# Patient Record
Sex: FEMALE
[De-identification: names, ages, dates, MRNs, and addresses within clinical notes are randomized per-mention and may not be internally consistent; named-entity substitution may affect disease eponyms.]

---

## 2023-04-08 PROBLEM — Z00.00 ENCOUNTER FOR PREVENTIVE HEALTH EXAMINATION: Status: ACTIVE | Noted: 2023-04-08

## 2023-04-19 ENCOUNTER — APPOINTMENT (OUTPATIENT)
Dept: MRI IMAGING | Facility: CLINIC | Age: 23
End: 2023-04-19
Payer: MEDICAID

## 2023-04-19 PROCEDURE — A9585: CPT

## 2023-04-19 PROCEDURE — 70553 MRI BRAIN STEM W/O & W/DYE: CPT

## 2024-06-05 ENCOUNTER — NON-APPOINTMENT (OUTPATIENT)
Age: 24
End: 2024-06-05

## 2024-06-24 ENCOUNTER — NON-APPOINTMENT (OUTPATIENT)
Age: 24
End: 2024-06-24

## 2024-08-21 ENCOUNTER — APPOINTMENT (OUTPATIENT)
Dept: ANTEPARTUM | Facility: CLINIC | Age: 24
End: 2024-08-21

## 2024-08-21 ENCOUNTER — ASOB RESULT (OUTPATIENT)
Age: 24
End: 2024-08-21

## 2024-08-21 PROCEDURE — 76813 OB US NUCHAL MEAS 1 GEST: CPT

## 2024-08-21 PROCEDURE — 76802 OB US < 14 WKS ADDL FETUS: CPT | Mod: 59

## 2024-08-21 PROCEDURE — 76814 OB US NUCHAL MEAS ADD-ON: CPT

## 2024-08-21 PROCEDURE — 76801 OB US < 14 WKS SINGLE FETUS: CPT | Mod: 59

## 2024-08-21 PROCEDURE — 99204 OFFICE O/P NEW MOD 45 MIN: CPT | Mod: 25

## 2024-09-06 ENCOUNTER — EMERGENCY (EMERGENCY)
Facility: HOSPITAL | Age: 24
LOS: 1 days | Discharge: HOME | End: 2024-09-06
Attending: STUDENT IN AN ORGANIZED HEALTH CARE EDUCATION/TRAINING PROGRAM
Payer: COMMERCIAL

## 2024-09-06 VITALS
WEIGHT: 180.56 LBS | SYSTOLIC BLOOD PRESSURE: 139 MMHG | TEMPERATURE: 99 F | HEART RATE: 110 BPM | DIASTOLIC BLOOD PRESSURE: 83 MMHG | OXYGEN SATURATION: 99 % | RESPIRATION RATE: 18 BRPM | HEIGHT: 66 IN

## 2024-09-06 LAB
ALBUMIN SERPL ELPH-MCNC: 3.9 G/DL — SIGNIFICANT CHANGE UP (ref 3.3–5.2)
ALP SERPL-CCNC: 75 U/L — SIGNIFICANT CHANGE UP (ref 40–120)
ALT FLD-CCNC: 15 U/L — SIGNIFICANT CHANGE UP
ANION GAP SERPL CALC-SCNC: 15 MMOL/L — SIGNIFICANT CHANGE UP (ref 5–17)
APPEARANCE UR: CLEAR — SIGNIFICANT CHANGE UP
APTT BLD: 25.5 SEC — SIGNIFICANT CHANGE UP (ref 24.5–35.6)
AST SERPL-CCNC: 18 U/L — SIGNIFICANT CHANGE UP
BACTERIA # UR AUTO: ABNORMAL /HPF
BASOPHILS # BLD AUTO: 0.03 K/UL — SIGNIFICANT CHANGE UP (ref 0–0.2)
BASOPHILS NFR BLD AUTO: 0.2 % — SIGNIFICANT CHANGE UP (ref 0–2)
BILIRUB SERPL-MCNC: 0.2 MG/DL — LOW (ref 0.4–2)
BILIRUB UR-MCNC: NEGATIVE — SIGNIFICANT CHANGE UP
BLD GP AB SCN SERPL QL: SIGNIFICANT CHANGE UP
BUN SERPL-MCNC: 5.2 MG/DL — LOW (ref 8–20)
CALCIUM SERPL-MCNC: 9.5 MG/DL — SIGNIFICANT CHANGE UP (ref 8.4–10.5)
CAST: 0 /LPF — SIGNIFICANT CHANGE UP (ref 0–4)
CHLORIDE SERPL-SCNC: 100 MMOL/L — SIGNIFICANT CHANGE UP (ref 96–108)
CO2 SERPL-SCNC: 22 MMOL/L — SIGNIFICANT CHANGE UP (ref 22–29)
COLOR SPEC: YELLOW — SIGNIFICANT CHANGE UP
CREAT SERPL-MCNC: 0.45 MG/DL — LOW (ref 0.5–1.3)
DIFF PNL FLD: NEGATIVE — SIGNIFICANT CHANGE UP
EGFR: 139 ML/MIN/1.73M2 — SIGNIFICANT CHANGE UP
EOSINOPHIL # BLD AUTO: 0.02 K/UL — SIGNIFICANT CHANGE UP (ref 0–0.5)
EOSINOPHIL NFR BLD AUTO: 0.1 % — SIGNIFICANT CHANGE UP (ref 0–6)
GLUCOSE SERPL-MCNC: 121 MG/DL — HIGH (ref 70–99)
GLUCOSE UR QL: NEGATIVE MG/DL — SIGNIFICANT CHANGE UP
HCT VFR BLD CALC: 33.6 % — LOW (ref 34.5–45)
HGB BLD-MCNC: 11.9 G/DL — SIGNIFICANT CHANGE UP (ref 11.5–15.5)
IMM GRANULOCYTES NFR BLD AUTO: 0.4 % — SIGNIFICANT CHANGE UP (ref 0–0.9)
INR BLD: 1.01 RATIO — SIGNIFICANT CHANGE UP (ref 0.85–1.18)
KETONES UR-MCNC: >=160 MG/DL
LDH SERPL L TO P-CCNC: 171 U/L — SIGNIFICANT CHANGE UP (ref 98–192)
LEUKOCYTE ESTERASE UR-ACNC: NEGATIVE — SIGNIFICANT CHANGE UP
LYMPHOCYTES # BLD AUTO: 1.85 K/UL — SIGNIFICANT CHANGE UP (ref 1–3.3)
LYMPHOCYTES # BLD AUTO: 13.9 % — SIGNIFICANT CHANGE UP (ref 13–44)
MCHC RBC-ENTMCNC: 29.8 PG — SIGNIFICANT CHANGE UP (ref 27–34)
MCHC RBC-ENTMCNC: 35.4 GM/DL — SIGNIFICANT CHANGE UP (ref 32–36)
MCV RBC AUTO: 84 FL — SIGNIFICANT CHANGE UP (ref 80–100)
MONOCYTES # BLD AUTO: 0.56 K/UL — SIGNIFICANT CHANGE UP (ref 0–0.9)
MONOCYTES NFR BLD AUTO: 4.2 % — SIGNIFICANT CHANGE UP (ref 2–14)
NEUTROPHILS # BLD AUTO: 10.82 K/UL — HIGH (ref 1.8–7.4)
NEUTROPHILS NFR BLD AUTO: 81.2 % — HIGH (ref 43–77)
NITRITE UR-MCNC: NEGATIVE — SIGNIFICANT CHANGE UP
PH UR: 6 — SIGNIFICANT CHANGE UP (ref 5–8)
PLATELET # BLD AUTO: 242 K/UL — SIGNIFICANT CHANGE UP (ref 150–400)
POTASSIUM SERPL-MCNC: 3.7 MMOL/L — SIGNIFICANT CHANGE UP (ref 3.5–5.3)
POTASSIUM SERPL-SCNC: 3.7 MMOL/L — SIGNIFICANT CHANGE UP (ref 3.5–5.3)
PROT SERPL-MCNC: 7 G/DL — SIGNIFICANT CHANGE UP (ref 6.6–8.7)
PROT UR-MCNC: NEGATIVE MG/DL — SIGNIFICANT CHANGE UP
PROTHROM AB SERPL-ACNC: 11.2 SEC — SIGNIFICANT CHANGE UP (ref 9.5–13)
RBC # BLD: 4 M/UL — SIGNIFICANT CHANGE UP (ref 3.8–5.2)
RBC # FLD: 13.2 % — SIGNIFICANT CHANGE UP (ref 10.3–14.5)
RBC CASTS # UR COMP ASSIST: 2 /HPF — SIGNIFICANT CHANGE UP (ref 0–4)
SODIUM SERPL-SCNC: 137 MMOL/L — SIGNIFICANT CHANGE UP (ref 135–145)
SP GR SPEC: 1.02 — SIGNIFICANT CHANGE UP (ref 1–1.03)
SQUAMOUS # UR AUTO: 4 /HPF — SIGNIFICANT CHANGE UP (ref 0–5)
UROBILINOGEN FLD QL: 1 MG/DL — SIGNIFICANT CHANGE UP (ref 0.2–1)
WBC # BLD: 13.34 K/UL — HIGH (ref 3.8–10.5)
WBC # FLD AUTO: 13.34 K/UL — HIGH (ref 3.8–10.5)
WBC UR QL: 4 /HPF — SIGNIFICANT CHANGE UP (ref 0–5)

## 2024-09-06 PROCEDURE — 86901 BLOOD TYPING SEROLOGIC RH(D): CPT

## 2024-09-06 PROCEDURE — 80053 COMPREHEN METABOLIC PANEL: CPT

## 2024-09-06 PROCEDURE — 81001 URINALYSIS AUTO W/SCOPE: CPT

## 2024-09-06 PROCEDURE — 96374 THER/PROPH/DIAG INJ IV PUSH: CPT

## 2024-09-06 PROCEDURE — 96376 TX/PRO/DX INJ SAME DRUG ADON: CPT

## 2024-09-06 PROCEDURE — 85025 COMPLETE CBC W/AUTO DIFF WBC: CPT

## 2024-09-06 PROCEDURE — 76815 OB US LIMITED FETUS(S): CPT | Mod: 26

## 2024-09-06 PROCEDURE — 76801 OB US < 14 WKS SINGLE FETUS: CPT | Mod: 26

## 2024-09-06 PROCEDURE — 76802 OB US < 14 WKS ADDL FETUS: CPT | Mod: 26

## 2024-09-06 PROCEDURE — 85730 THROMBOPLASTIN TIME PARTIAL: CPT

## 2024-09-06 PROCEDURE — 36415 COLL VENOUS BLD VENIPUNCTURE: CPT

## 2024-09-06 PROCEDURE — 99284 EMERGENCY DEPT VISIT MOD MDM: CPT | Mod: 25

## 2024-09-06 PROCEDURE — 86850 RBC ANTIBODY SCREEN: CPT

## 2024-09-06 PROCEDURE — 99284 EMERGENCY DEPT VISIT MOD MDM: CPT

## 2024-09-06 PROCEDURE — 83615 LACTATE (LD) (LDH) ENZYME: CPT

## 2024-09-06 PROCEDURE — 85610 PROTHROMBIN TIME: CPT

## 2024-09-06 PROCEDURE — 86900 BLOOD TYPING SEROLOGIC ABO: CPT

## 2024-09-06 PROCEDURE — 76802 OB US < 14 WKS ADDL FETUS: CPT

## 2024-09-06 PROCEDURE — 87086 URINE CULTURE/COLONY COUNT: CPT

## 2024-09-06 PROCEDURE — 76801 OB US < 14 WKS SINGLE FETUS: CPT

## 2024-09-06 RX ORDER — SODIUM CHLORIDE 9 MG/ML
1000 INJECTION INTRAMUSCULAR; INTRAVENOUS; SUBCUTANEOUS ONCE
Refills: 0 | Status: COMPLETED | OUTPATIENT
Start: 2024-09-06 | End: 2024-09-06

## 2024-09-06 RX ORDER — ONDANSETRON 2 MG/ML
4 INJECTION, SOLUTION INTRAMUSCULAR; INTRAVENOUS ONCE
Refills: 0 | Status: COMPLETED | OUTPATIENT
Start: 2024-09-06 | End: 2024-09-06

## 2024-09-06 RX ORDER — LIDOCAINE/BENZALKONIUM/ALCOHOL
1 SOLUTION, NON-ORAL TOPICAL ONCE
Refills: 0 | Status: COMPLETED | OUTPATIENT
Start: 2024-09-06 | End: 2024-09-06

## 2024-09-06 RX ADMIN — Medication 1 PATCH: at 18:02

## 2024-09-06 RX ADMIN — ONDANSETRON 4 MILLIGRAM(S): 2 INJECTION, SOLUTION INTRAMUSCULAR; INTRAVENOUS at 21:16

## 2024-09-06 RX ADMIN — SODIUM CHLORIDE 1000 MILLILITER(S): 9 INJECTION INTRAMUSCULAR; INTRAVENOUS; SUBCUTANEOUS at 18:03

## 2024-09-06 RX ADMIN — ONDANSETRON 4 MILLIGRAM(S): 2 INJECTION, SOLUTION INTRAMUSCULAR; INTRAVENOUS at 18:02

## 2024-09-06 NOTE — ED PROVIDER NOTE - PATIENT PORTAL LINK FT
You can access the FollowMyHealth Patient Portal offered by [facility] by registering at the following website: [email or web address]. By joining [employer]’s FollowMyHealth portal, you will also be able to view your health information using other applications (apps) compatible with our system.

## 2024-09-06 NOTE — ED ADULT TRIAGE NOTE - CHIEF COMPLAINT QUOTE
pt c/o abdominal pain and vaginal spotting, stated worsening with ingestion. patient is 14 weeks pregnant and is twins and is high risk.

## 2024-09-06 NOTE — ED PROVIDER NOTE - CLINICAL SUMMARY MEDICAL DECISION MAKING FREE TEXT BOX
Aram ATTG  23-year-old  14 weeks pregnant twin pregnancy.  Patient has been having a headache some nausea. pt does not have an obgyn and has been going to the clinic, pt called w/ her symptoms and told by someone to go to ed to be eval for preeclampsia  pt offered tyl but does not want, has been taking zofran w/ some relief   pt endorsed one episode of vaginal  spotting today     GENERAL: Awake, alert, NAD  HEENT: NC/AT, moist mucous membranes, PERRL, EOMI  ABDOMEN: Soft, , non tender, non distended, no rebound, no guarding  BACK: paraspinal tenderness   EXT: No edema, no calf tenderness, 2+ DP pulses bilaterally, no deformities.  NEURO: A&Ox3. Moving all extremities.  SKIN: Warm and dry. No rash.  PSYCH: Normal affect.    given hx and pe pt not >20 weeks less concerned for pre-ec vaginal spotting labs assessment likely dc home

## 2024-09-08 LAB
CULTURE RESULTS: SIGNIFICANT CHANGE UP
SPECIMEN SOURCE: SIGNIFICANT CHANGE UP

## 2024-09-25 ENCOUNTER — APPOINTMENT (OUTPATIENT)
Dept: ANTEPARTUM | Facility: CLINIC | Age: 24
End: 2024-09-25

## 2024-09-25 ENCOUNTER — ASOB RESULT (OUTPATIENT)
Age: 24
End: 2024-09-25

## 2024-09-25 PROCEDURE — 76805 OB US >/= 14 WKS SNGL FETUS: CPT

## 2024-09-25 PROCEDURE — 76817 TRANSVAGINAL US OBSTETRIC: CPT

## 2024-09-25 PROCEDURE — 76810 OB US >/= 14 WKS ADDL FETUS: CPT

## 2024-10-11 ENCOUNTER — EMERGENCY (EMERGENCY)
Facility: HOSPITAL | Age: 24
LOS: 1 days | End: 2024-10-11
Attending: EMERGENCY MEDICINE
Payer: COMMERCIAL

## 2024-10-11 ENCOUNTER — INPATIENT (INPATIENT)
Facility: HOSPITAL | Age: 24
LOS: 2 days | Discharge: HOME | DRG: 833 | End: 2024-10-14
Attending: OBSTETRICS & GYNECOLOGY | Admitting: OBSTETRICS & GYNECOLOGY
Payer: COMMERCIAL

## 2024-10-11 VITALS
TEMPERATURE: 98 F | HEART RATE: 112 BPM | SYSTOLIC BLOOD PRESSURE: 128 MMHG | DIASTOLIC BLOOD PRESSURE: 87 MMHG | HEIGHT: 66 IN | RESPIRATION RATE: 20 BRPM | OXYGEN SATURATION: 99 % | WEIGHT: 179.9 LBS

## 2024-10-11 VITALS — SYSTOLIC BLOOD PRESSURE: 121 MMHG | DIASTOLIC BLOOD PRESSURE: 72 MMHG | HEART RATE: 97 BPM

## 2024-10-11 DIAGNOSIS — O26.893 OTHER SPECIFIED PREGNANCY RELATED CONDITIONS, THIRD TRIMESTER: ICD-10-CM

## 2024-10-11 DIAGNOSIS — O47.00 FALSE LABOR BEFORE 37 COMPLETED WEEKS OF GESTATION, UNSPECIFIED TRIMESTER: ICD-10-CM

## 2024-10-11 DIAGNOSIS — O26.899 OTHER SPECIFIED PREGNANCY RELATED CONDITIONS, UNSPECIFIED TRIMESTER: ICD-10-CM

## 2024-10-11 DIAGNOSIS — N93.9 ABNORMAL UTERINE AND VAGINAL BLEEDING, UNSPECIFIED: ICD-10-CM

## 2024-10-11 DIAGNOSIS — Z3A.19 19 WEEKS GESTATION OF PREGNANCY: ICD-10-CM

## 2024-10-11 DIAGNOSIS — O30.049 TWIN PREGNANCY, DICHORIONIC/DIAMNIOTIC, UNSPECIFIED TRIMESTER: ICD-10-CM

## 2024-10-11 LAB
ALBUMIN SERPL ELPH-MCNC: 3.6 G/DL — SIGNIFICANT CHANGE UP (ref 3.3–5.2)
ALP SERPL-CCNC: 93 U/L — SIGNIFICANT CHANGE UP (ref 40–120)
ALT FLD-CCNC: 8 U/L — SIGNIFICANT CHANGE UP
ANION GAP SERPL CALC-SCNC: 14 MMOL/L — SIGNIFICANT CHANGE UP (ref 5–17)
APPEARANCE UR: CLEAR — SIGNIFICANT CHANGE UP
APTT BLD: 26.1 SEC — SIGNIFICANT CHANGE UP (ref 24.5–35.6)
AST SERPL-CCNC: 14 U/L — SIGNIFICANT CHANGE UP
BACTERIA # UR AUTO: NEGATIVE /HPF — SIGNIFICANT CHANGE UP
BASOPHILS # BLD AUTO: 0.04 K/UL — SIGNIFICANT CHANGE UP (ref 0–0.2)
BASOPHILS NFR BLD AUTO: 0.2 % — SIGNIFICANT CHANGE UP (ref 0–2)
BILIRUB SERPL-MCNC: <0.2 MG/DL — LOW (ref 0.4–2)
BILIRUB UR-MCNC: NEGATIVE — SIGNIFICANT CHANGE UP
BLD GP AB SCN SERPL QL: SIGNIFICANT CHANGE UP
BUN SERPL-MCNC: 7 MG/DL — LOW (ref 8–20)
CALCIUM SERPL-MCNC: 9.4 MG/DL — SIGNIFICANT CHANGE UP (ref 8.4–10.5)
CAST: 0 /LPF — SIGNIFICANT CHANGE UP (ref 0–4)
CHLORIDE SERPL-SCNC: 103 MMOL/L — SIGNIFICANT CHANGE UP (ref 96–108)
CO2 SERPL-SCNC: 20 MMOL/L — LOW (ref 22–29)
COLOR SPEC: YELLOW — SIGNIFICANT CHANGE UP
CREAT SERPL-MCNC: 0.36 MG/DL — LOW (ref 0.5–1.3)
DIFF PNL FLD: ABNORMAL
EGFR: 146 ML/MIN/1.73M2 — SIGNIFICANT CHANGE UP
EOSINOPHIL # BLD AUTO: 0.02 K/UL — SIGNIFICANT CHANGE UP (ref 0–0.5)
EOSINOPHIL NFR BLD AUTO: 0.1 % — SIGNIFICANT CHANGE UP (ref 0–6)
FIBRINOGEN PPP-MCNC: 504 MG/DL — HIGH (ref 200–450)
GLUCOSE SERPL-MCNC: 65 MG/DL — LOW (ref 70–99)
GLUCOSE UR QL: NEGATIVE MG/DL — SIGNIFICANT CHANGE UP
HCT VFR BLD CALC: 30.3 % — LOW (ref 34.5–45)
HGB BLD-MCNC: 10.4 G/DL — LOW (ref 11.5–15.5)
IMM GRANULOCYTES NFR BLD AUTO: 0.9 % — SIGNIFICANT CHANGE UP (ref 0–0.9)
KETONES UR-MCNC: 15 MG/DL
LEUKOCYTE ESTERASE UR-ACNC: ABNORMAL
LYMPHOCYTES # BLD AUTO: 14.4 % — SIGNIFICANT CHANGE UP (ref 13–44)
LYMPHOCYTES # BLD AUTO: 2.31 K/UL — SIGNIFICANT CHANGE UP (ref 1–3.3)
MCHC RBC-ENTMCNC: 29.5 PG — SIGNIFICANT CHANGE UP (ref 27–34)
MCHC RBC-ENTMCNC: 34.3 GM/DL — SIGNIFICANT CHANGE UP (ref 32–36)
MCV RBC AUTO: 86.1 FL — SIGNIFICANT CHANGE UP (ref 80–100)
MONOCYTES # BLD AUTO: 0.92 K/UL — HIGH (ref 0–0.9)
MONOCYTES NFR BLD AUTO: 5.7 % — SIGNIFICANT CHANGE UP (ref 2–14)
NEUTROPHILS # BLD AUTO: 12.65 K/UL — HIGH (ref 1.8–7.4)
NEUTROPHILS NFR BLD AUTO: 78.7 % — HIGH (ref 43–77)
NITRITE UR-MCNC: NEGATIVE — SIGNIFICANT CHANGE UP
PH UR: 6 — SIGNIFICANT CHANGE UP (ref 5–8)
PLATELET # BLD AUTO: 258 K/UL — SIGNIFICANT CHANGE UP (ref 150–400)
POTASSIUM SERPL-MCNC: 3.7 MMOL/L — SIGNIFICANT CHANGE UP (ref 3.5–5.3)
POTASSIUM SERPL-SCNC: 3.7 MMOL/L — SIGNIFICANT CHANGE UP (ref 3.5–5.3)
PROT SERPL-MCNC: 6.8 G/DL — SIGNIFICANT CHANGE UP (ref 6.6–8.7)
PROT UR-MCNC: NEGATIVE MG/DL — SIGNIFICANT CHANGE UP
RBC # BLD: 3.52 M/UL — LOW (ref 3.8–5.2)
RBC # FLD: 14.3 % — SIGNIFICANT CHANGE UP (ref 10.3–14.5)
RBC CASTS # UR COMP ASSIST: 37 /HPF — HIGH (ref 0–4)
SODIUM SERPL-SCNC: 136 MMOL/L — SIGNIFICANT CHANGE UP (ref 135–145)
SP GR SPEC: 1.02 — SIGNIFICANT CHANGE UP (ref 1–1.03)
SQUAMOUS # UR AUTO: 4 /HPF — SIGNIFICANT CHANGE UP (ref 0–5)
UROBILINOGEN FLD QL: 1 MG/DL — SIGNIFICANT CHANGE UP (ref 0.2–1)
WBC # BLD: 16.09 K/UL — HIGH (ref 3.8–10.5)
WBC # FLD AUTO: 16.09 K/UL — HIGH (ref 3.8–10.5)
WBC UR QL: 11 /HPF — HIGH (ref 0–5)

## 2024-10-11 PROCEDURE — 99284 EMERGENCY DEPT VISIT MOD MDM: CPT

## 2024-10-11 PROCEDURE — 99282 EMERGENCY DEPT VISIT SF MDM: CPT

## 2024-10-11 RX ORDER — ACETAMINOPHEN 325 MG
975 TABLET ORAL ONCE
Refills: 0 | Status: COMPLETED | OUTPATIENT
Start: 2024-10-11 | End: 2024-10-11

## 2024-10-11 RX ORDER — ONDANSETRON HCL/PF 4 MG/2 ML
4 VIAL (ML) INJECTION ONCE
Refills: 0 | Status: COMPLETED | OUTPATIENT
Start: 2024-10-11 | End: 2024-10-11

## 2024-10-11 RX ORDER — PRENATAL VIT,CAL 76/IRON/FOLIC 29 MG-1 MG
1 TABLET ORAL DAILY
Refills: 0 | Status: DISCONTINUED | OUTPATIENT
Start: 2024-10-11 | End: 2024-10-13

## 2024-10-11 RX ORDER — INFLUENZA VIRUS VACCINE 15; 15; 15; 15 UG/.5ML; UG/.5ML; UG/.5ML; UG/.5ML
0.5 SUSPENSION INTRAMUSCULAR ONCE
Refills: 0 | Status: COMPLETED | OUTPATIENT
Start: 2024-10-11 | End: 2024-10-14

## 2024-10-11 RX ADMIN — Medication 975 MILLIGRAM(S): at 19:00

## 2024-10-11 RX ADMIN — Medication 975 MILLIGRAM(S): at 17:58

## 2024-10-11 RX ADMIN — Medication 4 MILLIGRAM(S): at 17:58

## 2024-10-11 NOTE — OB RN TRIAGE NOTE - FALL HARM RISK - UNIVERSAL INTERVENTIONS
Bed in lowest position, wheels locked, appropriate side rails in place/Call bell, personal items and telephone in reach/Instruct patient to call for assistance before getting out of bed or chair/Non-slip footwear when patient is out of bed/[place] to call system/Physically safe environment - no spills, clutter or unnecessary equipment/Purposeful Proactive Rounding/Room/bathroom lighting operational, light cord in reach

## 2024-10-11 NOTE — ED PROVIDER NOTE - PATIENT PORTAL LINK FT
You can access the FollowMyHealth Patient Portal offered by [facility] by registering at the following website: [email or web address]. By joining [employer]’s FollowMyHealth portal, you will also be able to view your health information using other applications (apps) compatible with our system.

## 2024-10-11 NOTE — CONSULT NOTE ADULT - PROBLEM SELECTOR RECOMMENDATION 5
Twin A noted to be anhydramnios. Likely secondary to rupture of membranes. Discussed poor prognosis of PPROM at 19w6d with patient. Discussed risks and benefits of termination of pregnancy and dilation and evacuation. Discussed risks and benefits of expectant management including risks of chorioamnionitis and PTD. Discussed option of management via interval pregnancy but low likelihood of success. At this time patient would like to proceed with expectant management. Recommend serial abdominal exam every shift.

## 2024-10-11 NOTE — ED PROVIDER NOTE - CLINICAL SUMMARY MEDICAL DECISION MAKING FREE TEXT BOX
Patient presenting with lower abdominal cramping and vaginal bleeding in setting of pregnancy.  Given patient gestational age spoke with L&D who will take the patient for further evaluation.  Patient escorted to L&D

## 2024-10-11 NOTE — CONSULT NOTE ADULT - SUBJECTIVE AND OBJECTIVE BOX
SPENCER THAKKAR  23y  with di-di twin gestation at 19w4d by LMP who presents to L&D for vaginal bleeding since last night with new clots and cramping. Pt reports this is not the first episode of vaginal bleeding with her pregnancy and has had 2-3 episodes of spotting during the pregnancy however yesterday and today the bleeding had been heavier. She denies leakage of fluid. She has never felt the babies move. Denies fevers, chills, nausea, vomiting, chest pain, SOB, dizziness and headache. No other complaints at this time.     CHAS: 3/3/25  LMP: 24    Prenatal course is significant for:  di-di twin gestation  Obesity in pregnancy    POB: G1  PGYN: -fibroids, -ovarian cysts, denies STD hx, denies abnormal PAPs   PMH: gastritis, anxiety/depression  PSH: Denies  SH: Denies EtOH, tobacco and illicit drug use during this pregnancy; feels safe at home   Meds: PNVs  Allergies: NKDA      REVIEW OF SYSTEMS:    CONSTITUTIONAL: No weakness, fevers or chills  EYES/ENT: No visual changes;  No vertigo or throat pain   NECK: No pain or stiffness  RESPIRATORY: No cough, wheezing, hemoptysis; No shortness of breath  CARDIOVASCULAR: No chest pain or palpitations  GASTROINTESTINAL: No abdominal or epigastric pain. No nausea, vomiting, or hematemesis; No diarrhea or constipation. No melena or hematochezia.  GENITOURINARY: No dysuria, frequency or hematuria  NEUROLOGICAL: No numbness or weakness  SKIN: No itching, burning, rashes, or lesions   All other review of systems is negative unless indicated above.    Vital Signs:  Vital Signs Last 24 Hrs  T(C): 37 (11 Oct 2024 15:05), Max: 37 (11 Oct 2024 14:38)  T(F): 98.6 (11 Oct 2024 15:05), Max: 98.6 (11 Oct 2024 14:38)  HR: 120 (11 Oct 2024 16:58) (97 - 120)  BP: 135/82 (11 Oct 2024 16:58) (119/80 - 135/82)  RR: 20 (11 Oct 2024 15:05) (17 - 20)  SpO2: 99% (11 Oct 2024 13:07) (99% - 99%)    Parameters below as of 11 Oct 2024 15:05  Patient On (Oxygen Delivery Method): room air      Height (cm): 167.6 (10-11-24 @ 15:05), 167.6 (10-11-24 @ 13:07)  Weight (kg): 81.6 (10-11-24 @ 15:05), 81.6 (10-11-24 @ 13:07)  BMI (kg/m2): 29 (10-11-24 @ 15:05), 29 (10-11-24 @ 13:07)  BSA (m2): 1.91 (10-11-24 @ 15:05), 1.91 (10-11-24 @ 13:07)    Physical Exam:  General: Adult female in NAD  Head/Neck: No neck masses, no lymphadenopathy  Abdomen: soft, non-tender, gravid uterus  Ext: No cyanosis, edema or calf tenderness  Skin: No rashes or lesions on exposed skin  Neuro: Grossly intact  SSE: 10cc old dark blood in vaginal vault cleaned with gauze. Cervix visually dilated  SVE:  1.5/50/-3  Bedside sono: Twin A 156bpm, anterior/fundal placenta, vertex; Twin B: 146bpm, breech, posterior    Labs:                          10.4   16.09 )-----------( 258      ( 11 Oct 2024 15:46 )             30.3     10-11    136  |  103  |  7.0[L]  ----------------------------<  65[L]  3.7   |  20.0[L]  |  0.36[L]    Ca    9.4      11 Oct 2024 15:46    TPro  6.8  /  Alb  3.6  /  TBili  <0.2[L]  /  DBili  x   /  AST  14  /  ALT  8   /  AlkPhos  93  10-11    PTT - ( 11 Oct 2024 15:46 )  PTT:26.1 sec            Radiology:    MEDICATIONS  (STANDING):  prenatal multivitamin 1 Tablet(s) Oral daily    MEDICATIONS  (PRN):   SPENCER THAKKAR  23y  with di-di twin gestation at 19w6d by LMP who presents to L&D for vaginal bleeding since last night with new clots and cramping. Pt reports this is not the first episode of vaginal bleeding with her pregnancy and has had 2-3 episodes of spotting during the pregnancy however yesterday and today the bleeding had been heavier. She denies leakage of fluid. She has never felt the babies move. Denies fevers, chills, nausea, vomiting, chest pain, SOB, dizziness and headache. No other complaints at this time.     CHAS: 3/3/25  LMP: 24    Prenatal course is significant for:  di-di twin gestation  Obesity in pregnancy    POB: G1  PGYN: -fibroids, -ovarian cysts, denies STD hx, denies abnormal PAPs   PMH: gastritis, anxiety/depression  PSH: Denies  SH: Denies EtOH, tobacco and illicit drug use during this pregnancy; feels safe at home   Meds: PNVs  Allergies: NKDA      REVIEW OF SYSTEMS:    CONSTITUTIONAL: No weakness, fevers or chills  EYES/ENT: No visual changes;  No vertigo or throat pain   NECK: No pain or stiffness  RESPIRATORY: No cough, wheezing, hemoptysis; No shortness of breath  CARDIOVASCULAR: No chest pain or palpitations  GASTROINTESTINAL: No abdominal or epigastric pain. No nausea, vomiting, or hematemesis; No diarrhea or constipation. No melena or hematochezia.  GENITOURINARY: No dysuria, frequency or hematuria  NEUROLOGICAL: No numbness or weakness  SKIN: No itching, burning, rashes, or lesions   All other review of systems is negative unless indicated above.    Vital Signs:  Vital Signs Last 24 Hrs  T(C): 37 (11 Oct 2024 15:05), Max: 37 (11 Oct 2024 14:38)  T(F): 98.6 (11 Oct 2024 15:05), Max: 98.6 (11 Oct 2024 14:38)  HR: 120 (11 Oct 2024 16:58) (97 - 120)  BP: 135/82 (11 Oct 2024 16:58) (119/80 - 135/82)  RR: 20 (11 Oct 2024 15:05) (17 - 20)  SpO2: 99% (11 Oct 2024 13:07) (99% - 99%)    Parameters below as of 11 Oct 2024 15:05  Patient On (Oxygen Delivery Method): room air      Height (cm): 167.6 (10-11-24 @ 15:05), 167.6 (10-11-24 @ 13:07)  Weight (kg): 81.6 (10-11-24 @ 15:05), 81.6 (10-11-24 @ 13:07)  BMI (kg/m2): 29 (10-11-24 @ 15:05), 29 (10-11-24 @ 13:07)  BSA (m2): 1.91 (10-11-24 @ 15:05), 1.91 (10-11-24 @ 13:07)    Physical Exam:  General: Adult female in NAD  Head/Neck: No neck masses, no lymphadenopathy  Abdomen: soft, non-tender, gravid uterus  Ext: No cyanosis, edema or calf tenderness  Skin: No rashes or lesions on exposed skin  Neuro: Grossly intact  SSE: 10cc old dark blood in vaginal vault cleaned with gauze. Cervix visually dilated  SVE:  1.5/50/-3  Bedside sono: Twin A 156bpm, anterior/fundal placenta, vertex; Twin B: 146bpm, breech, posterior    Labs:                          10.4   16.09 )-----------( 258      ( 11 Oct 2024 15:46 )             30.3     10    136  |  103  |  7.0[L]  ----------------------------<  65[L]  3.7   |  20.0[L]  |  0.36[L]    Ca    9.4      11 Oct 2024 15:46    TPro  6.8  /  Alb  3.6  /  TBili  <0.2[L]  /  DBili  x   /  AST  14  /  ALT  8   /  AlkPhos  93  10-11    PTT - ( 11 Oct 2024 15:46 )  PTT:26.1 sec            Radiology:    MEDICATIONS  (STANDING):  prenatal multivitamin 1 Tablet(s) Oral daily    MEDICATIONS  (PRN):   SPENCER THAKKAR  23y  with di-di twin gestation at 19w6d by LMP who presents to L&D for vaginal bleeding since last night with new clots and cramping. Pt reports this is not the first episode of vaginal bleeding with her pregnancy and has had 2-3 episodes of spotting during the pregnancy however yesterday and today the bleeding had been heavier. She denies leakage of fluid. She has never felt the babies move. Denies fevers, chills, nausea, vomiting, chest pain, SOB, dizziness and headache. No other complaints at this time.     CHAS: 3/1/25  LMP: 24    Prenatal course is significant for:  di-di twin gestation  Obesity in pregnancy    POB: G1  PGYN: -fibroids, -ovarian cysts, denies STD hx, denies abnormal PAPs   PMH: gastritis, anxiety/depression  PSH: Denies  SH: Denies EtOH, tobacco and illicit drug use during this pregnancy; feels safe at home   Meds: PNVs  Allergies: NKDA      REVIEW OF SYSTEMS:    CONSTITUTIONAL: No weakness, fevers or chills  EYES/ENT: No visual changes;  No vertigo or throat pain   NECK: No pain or stiffness  RESPIRATORY: No cough, wheezing, hemoptysis; No shortness of breath  CARDIOVASCULAR: No chest pain or palpitations  GASTROINTESTINAL: No abdominal or epigastric pain. No nausea, vomiting, or hematemesis; No diarrhea or constipation. No melena or hematochezia.  GENITOURINARY: No dysuria, frequency or hematuria  NEUROLOGICAL: No numbness or weakness  SKIN: No itching, burning, rashes, or lesions   All other review of systems is negative unless indicated above.    Vital Signs:  Vital Signs Last 24 Hrs  T(C): 37 (11 Oct 2024 15:05), Max: 37 (11 Oct 2024 14:38)  T(F): 98.6 (11 Oct 2024 15:05), Max: 98.6 (11 Oct 2024 14:38)  HR: 120 (11 Oct 2024 16:58) (97 - 120)  BP: 135/82 (11 Oct 2024 16:58) (119/80 - 135/82)  RR: 20 (11 Oct 2024 15:05) (17 - 20)  SpO2: 99% (11 Oct 2024 13:07) (99% - 99%)    Parameters below as of 11 Oct 2024 15:05  Patient On (Oxygen Delivery Method): room air      Height (cm): 167.6 (10-11-24 @ 15:05), 167.6 (10-11-24 @ 13:07)  Weight (kg): 81.6 (10-11-24 @ 15:05), 81.6 (10-11-24 @ 13:07)  BMI (kg/m2): 29 (10-11-24 @ 15:05), 29 (10-11-24 @ 13:07)  BSA (m2): 1.91 (10-11-24 @ 15:05), 1.91 (10-11-24 @ 13:07)    Physical Exam:  General: Adult female in NAD  Head/Neck: No neck masses, no lymphadenopathy  Abdomen: soft, non-tender, gravid uterus  Ext: No cyanosis, edema or calf tenderness  Skin: No rashes or lesions on exposed skin  Neuro: Grossly intact  SSE: 10cc old dark blood in vaginal vault cleaned with gauze. Cervix visually dilated  SVE:  1.5/50/-3  Bedside sono: Twin A 156bpm, anterior/fundal placenta, vertex; Twin B: 146bpm, breech, posterior    Labs:                          10.4   16.09 )-----------( 258      ( 11 Oct 2024 15:46 )             30.3     10    136  |  103  |  7.0[L]  ----------------------------<  65[L]  3.7   |  20.0[L]  |  0.36[L]    Ca    9.4      11 Oct 2024 15:46    TPro  6.8  /  Alb  3.6  /  TBili  <0.2[L]  /  DBili  x   /  AST  14  /  ALT  8   /  AlkPhos  93  10-11    PTT - ( 11 Oct 2024 15:46 )  PTT:26.1 sec            Radiology:    MEDICATIONS  (STANDING):  prenatal multivitamin 1 Tablet(s) Oral daily    MEDICATIONS  (PRN):   SPENCER THAKKAR  23y  with di-di twin gestation at 19w6d by LMP who presents to L&D for vaginal bleeding since last night with new clots and cramping. Pt reports this is not the first episode of vaginal bleeding with her pregnancy and has had 2-3 episodes of spotting during the pregnancy however yesterday and today the bleeding had been heavier. She denies leakage of fluid. She has never felt the babies move. Denies fevers, chills, nausea, vomiting, chest pain, SOB, dizziness and headache. No other complaints at this time.     CHAS: 3/1/25  LMP: 24    Prenatal course is significant for:  di-di twin gestation  Obesity in pregnancy    POB: G1  PGYN: -fibroids, -ovarian cysts, denies STD hx, denies abnormal PAPs   PMH: gastritis, anxiety/depression  PSH: Denies  SH: Denies EtOH, tobacco and illicit drug use during this pregnancy; feels safe at home   Meds: PNVs  Allergies: NKDA      REVIEW OF SYSTEMS:    CONSTITUTIONAL: No weakness, fevers or chills  EYES/ENT: No visual changes;  No vertigo or throat pain   NECK: No pain or stiffness  RESPIRATORY: No cough, wheezing, hemoptysis; No shortness of breath  CARDIOVASCULAR: No chest pain or palpitations  GASTROINTESTINAL: No abdominal or epigastric pain. No nausea, vomiting, or hematemesis; No diarrhea or constipation. No melena or hematochezia.  GENITOURINARY: No dysuria, frequency or hematuria  NEUROLOGICAL: No numbness or weakness  SKIN: No itching, burning, rashes, or lesions   All other review of systems is negative unless indicated above.    Vital Signs:  Vital Signs Last 24 Hrs  T(C): 37 (11 Oct 2024 15:05), Max: 37 (11 Oct 2024 14:38)  T(F): 98.6 (11 Oct 2024 15:05), Max: 98.6 (11 Oct 2024 14:38)  HR: 120 (11 Oct 2024 16:58) (97 - 120)  BP: 135/82 (11 Oct 2024 16:58) (119/80 - 135/82)  RR: 20 (11 Oct 2024 15:05) (17 - 20)  SpO2: 99% (11 Oct 2024 13:07) (99% - 99%)    Parameters below as of 11 Oct 2024 15:05  Patient On (Oxygen Delivery Method): room air      Height (cm): 167.6 (10-11-24 @ 15:05), 167.6 (10-11-24 @ 13:07)  Weight (kg): 81.6 (10-11-24 @ 15:05), 81.6 (10-11-24 @ 13:07)  BMI (kg/m2): 29 (10-11-24 @ 15:05), 29 (10-11-24 @ 13:07)  BSA (m2): 1.91 (10-11-24 @ 15:05), 1.91 (10-11-24 @ 13:07)    Physical Exam:  General: Adult female in NAD  Head/Neck: No neck masses, no lymphadenopathy  Abdomen: soft, non-tender, gravid uterus  Ext: No cyanosis, edema or calf tenderness  Skin: No rashes or lesions on exposed skin  Neuro: Grossly intact  SSE: 10cc old dark blood in vaginal vault cleaned with gauze. Cervix visually dilated, no gross pooling  SVE:  1.5/50/-3  Bedside sono: Twin A 156bpm, anterior/fundal placenta, vertex; Twin B: 146bpm, breech, posterior  Ferning negative    Labs:                          10.4   16.09 )-----------( 258      ( 11 Oct 2024 15:46 )             30.3     10    136  |  103  |  7.0[L]  ----------------------------<  65[L]  3.7   |  20.0[L]  |  0.36[L]    Ca    9.4      11 Oct 2024 15:46    TPro  6.8  /  Alb  3.6  /  TBili  <0.2[L]  /  DBili  x   /  AST  14  /  ALT  8   /  AlkPhos  93  10-11    PTT - ( 11 Oct 2024 15:46 )  PTT:26.1 sec            Radiology:    MEDICATIONS  (STANDING):  prenatal multivitamin 1 Tablet(s) Oral daily    MEDICATIONS  (PRN):   denies drug use/caffeine

## 2024-10-11 NOTE — ED ADULT TRIAGE NOTE - CHIEF COMPLAINT QUOTE
pt arrives to ED c/o abdominal pain and passing clots, pt is 19 weeks pregnant, denies N/V/D/CP/SOB, sent by OB

## 2024-10-11 NOTE — ED PROVIDER NOTE - OBJECTIVE STATEMENT
Patient is a 23-year-old female G1, P0 at 19 weeks and 4 days gestation presenting with lower abdominal pain, cramping, back pain and vaginal bleeding with clots.  Patient is not on any fertility treatments.  Has had IUP confirmed as twin gestation.  Patient notes she had similar symptoms about 1 week ago and was told that everything was okay but if symptoms worsened or return she should come back to the emergency department which happened today.  Denies any trauma, nausea, vomiting, diarrhea.  No urinary complaints.  Patient currently on Keflex for urinary tract infection.

## 2024-10-11 NOTE — OB RN TRIAGE NOTE - NSICDXPASTMEDICALHX_GEN_ALL_CORE_FT
PAST MEDICAL HISTORY:  No pertinent past medical history PAST MEDICAL HISTORY:  H/O gastritis     History of HPV infection

## 2024-10-11 NOTE — CONSULT NOTE ADULT - PROBLEM SELECTOR RECOMMENDATION 3
Di/Di twin gestation. Twin A noted to be vertex presentation, posterior placenta, anhydramnios. Twin B noted to be breech presentation, fundal placenta. MVP >5cm.

## 2024-10-11 NOTE — OB RN PATIENT PROFILE - WEIGHT: TOTAL WEIGHT IN KG
Detail Level: Simple
Price (Do Not Change): 0.00
Instructions: This plan will send the code FBSD to the PM system.  DO NOT or CHANGE the price.
-4

## 2024-10-11 NOTE — OB PROVIDER H&P - ATTENDING COMMENTS
Patient presents to L&D triage with complaints of bleeding.  On initial assessment patient found to be 1-2cm dilated, and possiblity of abruption vs PTL.  However after sonogram performed there is anhydramnios of Baby A.  The patient was counseled of the high likelihood of  rupture of membranes. Given the previable gestational age discussion was had about expectant management until GA where interventions could take place such as  corticosteroids. We discussed the high risk of developing an infection, abruption or going into labor. We are admitting her at this time monitor for these things.  We also discussed discontinuation of the pregnancy with induction of labor or D&E as anhydramnios at 19 weeks has a poor prognosis for the baby including fetal lung underdevelopment, limb contractures.  At this time the patient is not considering termination.  She would like expectant management.  We also reviewed that twin A may deliver and if possible and there are no signs of infection at the time of that delivery and maternal status is stable, a delayed interval delivery of twin B could be attempted.    The patient's WBC count is 16, currently afebrile and no further bleeding.    Admit for inpatient monitoring for now.

## 2024-10-11 NOTE — OB PROVIDER H&P - ASSESSMENT
A/P: 23y  with di-di twin gestation at 19w4d by LMP who is admitted in the setting of vaginal bleeding r/o chronic abruption vs PTL.  -Admit to L&D  -Consent  -Admission labs  -T&S, coags  -MFM consult for possible cerclage  -BV, GCCT collected    Discussed with Dr. Aden  A/P: 23y  with di-di twin gestation at 19w4d by LMP who is admitted in the setting of vaginal bleeding r/o chronic abruption vs PTL.  -Admit to L&D  -Consent  -Admission labs  -T&S, coags  -MFM consult-BV, GCCT collected    Discussed with Dr. Aden

## 2024-10-11 NOTE — CONSULT NOTE ADULT - PROBLEM SELECTOR RECOMMENDATION 2
Patient presenting with vaginal bleeding. 10-15cc of dark red blood on speculum exam. No active bleeding from os. Cervix is 1.5/50/-3.

## 2024-10-11 NOTE — CONSULT NOTE ADULT - ASSESSMENT
23y  with di-di twin gestation at 19w4d by LMP who presents to L&D for vaginal bleeding and cramping. 23y  with di-di twin gestation at 19w6d by LMP who presents to L&D for vaginal bleeding and cramping.

## 2024-10-11 NOTE — OB PROVIDER H&P - HISTORY OF PRESENT ILLNESS
23y  with di-di twin gestation at 19w4d by LMP who presents to L&D for vaginal bleeding since last night with new clots and cramping. Pt reports this is not the first episode of vaginal bleeding with her pregnancy and has had 2-3 episodes of spotting during the pregnancy however yesterday and today the bleeding had been heavier. She denies leakage of fluid. She has never felt the babies move. Denies fevers, chills, nausea, vomiting, chest pain, SOB, dizziness and headache. No other complaints at this time.     Prenatal course is significant for:  di-di twin gestation  Obesity in pregnancy    POB: G1  PGYN: -fibroids, -ovarian cysts, denies STD hx, denies abnormal PAPs   PMH: gastritis, anxiety/depression  PSH: Denies  SH: Denies EtOH, tobacco and illicit drug use during this pregnancy; feels safe at home   Meds: PNVs  Allergies: NKDA    T(C): 37 (10-11-24 @ 14:38), Max: 37 (10-11-24 @ 14:38)  HR: 106 (10-11-24 @ 15:10) (97 - 112)  BP: 119/80 (10-11-24 @ 15:10) (119/80 - 128/87)  RR: 17 (10-11-24 @ 14:38) (17 - 20)  SpO2: 99% (10-11-24 @ 13:07) (99% - 99%)    Gen: NAD, well-appearing, AAOx3   Abd: Soft, gravid  Ext: non-tender, non-edematous  SSE: 10cc old dark blood in vaginal vault cleaned with gauze. Cervix visually dilated  SVE:  1.5/50/-3, CL could not be performed at this time due to   Bedside sono:  FHT:  [place] [facility]:       A/P:   -Admit to L&D  -Consent  -Admission labs  -NPO, except ice chips   -IV fluids  -Labor: Intact/*ROM. Latent/Active labor. Prabhjot *.   -Fetus: Cat I tracing. Continuous toco and fetal monitoring.   -GBS: Negative, no GBS ppx required   -Analgesia:     Discussed with Dr. Vazquez 23y  with di-di twin gestation at 19w4d by LMP who presents to L&D for vaginal bleeding since last night with new clots and cramping. Pt reports this is not the first episode of vaginal bleeding with her pregnancy and has had 2-3 episodes of spotting during the pregnancy however yesterday and today the bleeding had been heavier. She denies leakage of fluid. She has never felt the babies move. Denies fevers, chills, nausea, vomiting, chest pain, SOB, dizziness and headache. No other complaints at this time.   CHAS: 3/3/25  LMP: 24    Prenatal course is significant for:  di-di twin gestation  Obesity in pregnancy    POB: G1  PGYN: -fibroids, -ovarian cysts, denies STD hx, denies abnormal PAPs   PMH: gastritis, anxiety/depression  PSH: Denies  SH: Denies EtOH, tobacco and illicit drug use during this pregnancy; feels safe at home   Meds: PNVs  Allergies: NKDA    T(C): 37 (10-11-24 @ 14:38), Max: 37 (10-11-24 @ 14:38)  HR: 106 (10-11-24 @ 15:10) (97 - 112)  BP: 119/80 (10-11-24 @ 15:10) (119/80 - 128/87)  RR: 17 (10-11-24 @ 14:38) (17 - 20)  SpO2: 99% (10-11-24 @ 13:07) (99% - 99%)    Gen: NAD, well-appearing, AAOx3   Abd: Soft, gravid  Ext: non-tender, non-edematous  SSE: 10cc old dark blood in vaginal vault cleaned with gauze. Cervix visually dilated  SVE:  1.5/50/-3, CL could not be performed at this time due to   Bedside sono pending.  FH:        23y  with di-di twin gestation at 19w4d by LMP who presents to L&D for vaginal bleeding since last night with new clots and cramping. Pt reports this is not the first episode of vaginal bleeding with her pregnancy and has had 2-3 episodes of spotting during the pregnancy however yesterday and today the bleeding had been heavier. She denies leakage of fluid. She has never felt the babies move. Denies fevers, chills, nausea, vomiting, chest pain, SOB, dizziness and headache. No other complaints at this time.   CHAS: 3/3/25  LMP: 24    Prenatal course is significant for:  di-di twin gestation  Obesity in pregnancy    POB: G1  PGYN: -fibroids, -ovarian cysts, denies STD hx, denies abnormal PAPs   PMH: gastritis, anxiety/depression  PSH: Denies  SH: Denies EtOH, tobacco and illicit drug use during this pregnancy; feels safe at home   Meds: PNVs  Allergies: NKDA    T(C): 37 (10-11-24 @ 14:38), Max: 37 (10-11-24 @ 14:38)  HR: 106 (10-11-24 @ 15:10) (97 - 112)  BP: 119/80 (10-11-24 @ 15:10) (119/80 - 128/87)  RR: 17 (10-11-24 @ 14:38) (17 - 20)  SpO2: 99% (10-11-24 @ 13:07) (99% - 99%)    Gen: NAD, well-appearing, AAOx3   Abd: Soft, gravid  Ext: non-tender, non-edematous  SSE: 10cc old dark blood in vaginal vault cleaned with gauze. Cervix visually dilated  SVE:  1.5/50/-3, CL could not be performed at this time due to   Bedside sono: Twin A 156bpm, fundal/anterior placenta, vertex; Twin B: 146bpm, breech, posterior        23y  with di-di twin gestation at 19w4d by LMP who presents to L&D for vaginal bleeding since last night with new clots and cramping. Pt reports this is not the first episode of vaginal bleeding with her pregnancy and has had 2-3 episodes of spotting during the pregnancy however yesterday and today the bleeding had been heavier. She denies leakage of fluid. She has never felt the babies move. Denies fevers, chills, nausea, vomiting, chest pain, SOB, dizziness and headache. No other complaints at this time.   CHAS: 3/3/25  LMP: 24    Prenatal course is significant for:  di-di twin gestation  Obesity in pregnancy    POB: G1  PGYN: -fibroids, -ovarian cysts, denies STD hx, denies abnormal PAPs   PMH: gastritis, anxiety/depression  PSH: Denies  SH: Denies EtOH, tobacco and illicit drug use during this pregnancy; feels safe at home   Meds: PNVs  Allergies: NKDA    T(C): 37 (10-11-24 @ 14:38), Max: 37 (10-11-24 @ 14:38)  HR: 106 (10-11-24 @ 15:10) (97 - 112)  BP: 119/80 (10-11-24 @ 15:10) (119/80 - 128/87)  RR: 17 (10-11-24 @ 14:38) (17 - 20)  SpO2: 99% (10-11-24 @ 13:07) (99% - 99%)    Gen: NAD, well-appearing, AAOx3   Abd: Soft, gravid  Ext: non-tender, non-edematous  SSE: 10cc old dark blood in vaginal vault cleaned with gauze. Cervix visually dilated  SVE:  1.5/50/-3, CL could not be performed at this time due to   Bedside sono: Twin A 156bpm, posterior placenta, vertex; Twin B: 146bpm, breech, anterior       23y  with di-di twin gestation at 19w4d by LMP who presents to L&D for vaginal bleeding since last night with new clots and cramping. Pt reports this is not the first episode of vaginal bleeding with her pregnancy and has had 2-3 episodes of spotting during the pregnancy however yesterday and today the bleeding had been heavier. She denies leakage of fluid. She has never felt the babies move. Denies fevers, chills, nausea, vomiting, chest pain, SOB, dizziness and headache. No other complaints at this time.   CHAS: 3/3/25  LMP: 24    Prenatal course is significant for:  di-di twin gestation  Obesity in pregnancy    POB: G1  PGYN: -fibroids, -ovarian cysts, denies STD hx, denies abnormal PAPs   PMH: gastritis, anxiety/depression  PSH: Denies  SH: Denies EtOH, tobacco and illicit drug use during this pregnancy; feels safe at home   Meds: PNVs  Allergies: NKDA    T(C): 37 (10-11-24 @ 14:38), Max: 37 (10-11-24 @ 14:38)  HR: 106 (10-11-24 @ 15:10) (97 - 112)  BP: 119/80 (10-11-24 @ 15:10) (119/80 - 128/87)  RR: 17 (10-11-24 @ 14:38) (17 - 20)  SpO2: 99% (10-11-24 @ 13:07) (99% - 99%)    Gen: NAD, well-appearing, AAOx3   Abd: Soft, gravid  Ext: non-tender, non-edematous  SSE: 10cc old dark blood in vaginal vault cleaned with gauze. Cervix visually dilated  SVE:  1.5/50/-3, CL could not be performed at this time due to   Bedside sono: Twin A 156bpm, anterior/fundal placenta, vertex; Twin B: 146bpm, breech, posterior       23y  with di-di twin gestation at 19w6d by LMP who presents to L&D for vaginal bleeding since last night with new clots and cramping. Pt reports this is not the first episode of vaginal bleeding with her pregnancy and has had 2-3 episodes of spotting during the pregnancy however yesterday and today the bleeding had been heavier. She denies leakage of fluid. She has never felt the babies move. Denies fevers, chills, nausea, vomiting, chest pain, SOB, dizziness and headache. No other complaints at this time.   CHAS: 3/3/25  LMP: 24    Prenatal course is significant for:  di-di twin gestation  Obesity in pregnancy    POB: G1  PGYN: -fibroids, -ovarian cysts, denies STD hx, denies abnormal PAPs   PMH: gastritis, anxiety/depression  PSH: Denies  SH: Denies EtOH, tobacco and illicit drug use during this pregnancy; feels safe at home   Meds: PNVs  Allergies: NKDA    T(C): 37 (10-11-24 @ 14:38), Max: 37 (10-11-24 @ 14:38)  HR: 106 (10-11-24 @ 15:10) (97 - 112)  BP: 119/80 (10-11-24 @ 15:10) (119/80 - 128/87)  RR: 17 (10-11-24 @ 14:38) (17 - 20)  SpO2: 99% (10-11-24 @ 13:07) (99% - 99%)    Gen: NAD, well-appearing, AAOx3   Abd: Soft, gravid  Ext: non-tender, non-edematous  SSE: 10cc old dark blood in vaginal vault cleaned with gauze. Cervix visually dilated  SVE:  1.5/50/-3, CL could not be performed at this time due to   Bedside sono: Twin A 156bpm, anterior/fundal placenta, vertex; Twin B: 146bpm, breech, posterior       23y  with di-di twin gestation at 19w6d by LMP who presents to L&D for vaginal bleeding since last night with new clots and cramping. Pt reports this is not the first episode of vaginal bleeding with her pregnancy and has had 2-3 episodes of spotting during the pregnancy however yesterday and today the bleeding had been heavier. She denies leakage of fluid. She has never felt the babies move. Denies fevers, chills, nausea, vomiting, chest pain, SOB, dizziness and headache. No other complaints at this time.   CHAS: 3/1/25  LMP: 24    Prenatal course is significant for:  di-di twin gestation  Obesity in pregnancy    POB: G1  PGYN: -fibroids, -ovarian cysts, denies STD hx, denies abnormal PAPs   PMH: gastritis, anxiety/depression  PSH: Denies  SH: Denies EtOH, tobacco and illicit drug use during this pregnancy; feels safe at home   Meds: PNVs  Allergies: NKDA    T(C): 37 (10-11-24 @ 14:38), Max: 37 (10-11-24 @ 14:38)  HR: 106 (10-11-24 @ 15:10) (97 - 112)  BP: 119/80 (10-11-24 @ 15:10) (119/80 - 128/87)  RR: 17 (10-11-24 @ 14:38) (17 - 20)  SpO2: 99% (10-11-24 @ 13:07) (99% - 99%)    Gen: NAD, well-appearing, AAOx3   Abd: Soft, gravid  Ext: non-tender, non-edematous  SSE: 10cc old dark blood in vaginal vault cleaned with gauze. Cervix visually dilated  SVE:  1.5/50/-3, CL could not be performed at this time due to   Bedside sono: Twin A 156bpm, anterior/fundal placenta, vertex; Twin B: 146bpm, breech, posterior       23y  with di-di twin gestation at 19w6d by LMP who presents to L&D for vaginal bleeding since last night with new clots and cramping. Pt reports this is not the first episode of vaginal bleeding with her pregnancy and has had 2-3 episodes of spotting during the pregnancy however yesterday and today the bleeding had been heavier. She denies leakage of fluid. She has never felt the babies move. Denies fevers, chills, nausea, vomiting, chest pain, SOB, dizziness and headache. No other complaints at this time.   CHAS: 3/1/25  LMP: 24    Prenatal course is significant for:  di-di twin gestation  Obesity in pregnancy    POB: G1  PGYN: -fibroids, -ovarian cysts, denies STD hx, denies abnormal PAPs   PMH: gastritis, anxiety/depression  PSH: Denies  SH: Denies EtOH, tobacco and illicit drug use during this pregnancy; feels safe at home   Meds: PNVs  Allergies: NKDA    T(C): 37 (10-11-24 @ 14:38), Max: 37 (10-11-24 @ 14:38)  HR: 106 (10-11-24 @ 15:10) (97 - 112)  BP: 119/80 (10-11-24 @ 15:10) (119/80 - 128/87)  RR: 17 (10-11-24 @ 14:38) (17 - 20)  SpO2: 99% (10-11-24 @ 13:07) (99% - 99%)    Gen: NAD, well-appearing, AAOx3   Abd: Soft, gravid  Ext: non-tender, non-edematous  SSE: 10cc old dark blood in vaginal vault cleaned with gauze. Cervix visually dilated  SVE:  1.5/50/-3,   Bedside sono: Twin A 156bpm, anterior/fundal placenta, vertex, no amniotic fluid; Twin B: 146bpm, breech, posterior

## 2024-10-12 LAB
ALBUMIN SERPL ELPH-MCNC: 3.1 G/DL — LOW (ref 3.3–5.2)
ALP SERPL-CCNC: 82 U/L — SIGNIFICANT CHANGE UP (ref 40–120)
ALT FLD-CCNC: 8 U/L — SIGNIFICANT CHANGE UP
ANION GAP SERPL CALC-SCNC: 11 MMOL/L — SIGNIFICANT CHANGE UP (ref 5–17)
ANISOCYTOSIS BLD QL: SLIGHT — SIGNIFICANT CHANGE UP
AST SERPL-CCNC: 14 U/L — SIGNIFICANT CHANGE UP
BASOPHILS # BLD AUTO: 0 K/UL — SIGNIFICANT CHANGE UP (ref 0–0.2)
BASOPHILS NFR BLD AUTO: 0 % — SIGNIFICANT CHANGE UP (ref 0–2)
BILIRUB SERPL-MCNC: 0.2 MG/DL — LOW (ref 0.4–2)
BUN SERPL-MCNC: 5.2 MG/DL — LOW (ref 8–20)
CALCIUM SERPL-MCNC: 8.8 MG/DL — SIGNIFICANT CHANGE UP (ref 8.4–10.5)
CANDIDA AB TITR SER: SIGNIFICANT CHANGE UP
CHLORIDE SERPL-SCNC: 105 MMOL/L — SIGNIFICANT CHANGE UP (ref 96–108)
CO2 SERPL-SCNC: 21 MMOL/L — LOW (ref 22–29)
CREAT SERPL-MCNC: 0.41 MG/DL — LOW (ref 0.5–1.3)
EGFR: 142 ML/MIN/1.73M2 — SIGNIFICANT CHANGE UP
ELLIPTOCYTES BLD QL SMEAR: SLIGHT — SIGNIFICANT CHANGE UP
EOSINOPHIL # BLD AUTO: 0.12 K/UL — SIGNIFICANT CHANGE UP (ref 0–0.5)
EOSINOPHIL NFR BLD AUTO: 0.9 % — SIGNIFICANT CHANGE UP (ref 0–6)
G VAGINALIS DNA SPEC QL NAA+PROBE: SIGNIFICANT CHANGE UP
GIANT PLATELETS BLD QL SMEAR: PRESENT — SIGNIFICANT CHANGE UP
GLUCOSE SERPL-MCNC: 77 MG/DL — SIGNIFICANT CHANGE UP (ref 70–99)
HCT VFR BLD CALC: 27.7 % — LOW (ref 34.5–45)
HGB BLD-MCNC: 9.6 G/DL — LOW (ref 11.5–15.5)
LYMPHOCYTES # BLD AUTO: 17.5 % — SIGNIFICANT CHANGE UP (ref 13–44)
LYMPHOCYTES # BLD AUTO: 2.34 K/UL — SIGNIFICANT CHANGE UP (ref 1–3.3)
MANUAL SMEAR VERIFICATION: SIGNIFICANT CHANGE UP
MCHC RBC-ENTMCNC: 29.8 PG — SIGNIFICANT CHANGE UP (ref 27–34)
MCHC RBC-ENTMCNC: 34.7 GM/DL — SIGNIFICANT CHANGE UP (ref 32–36)
MCV RBC AUTO: 86 FL — SIGNIFICANT CHANGE UP (ref 80–100)
MONOCYTES # BLD AUTO: 1.06 K/UL — HIGH (ref 0–0.9)
MONOCYTES NFR BLD AUTO: 7.9 % — SIGNIFICANT CHANGE UP (ref 2–14)
NEUTROPHILS # BLD AUTO: 9.63 K/UL — HIGH (ref 1.8–7.4)
NEUTROPHILS NFR BLD AUTO: 71.9 % — SIGNIFICANT CHANGE UP (ref 43–77)
OVALOCYTES BLD QL SMEAR: SLIGHT — SIGNIFICANT CHANGE UP
PLAT MORPH BLD: NORMAL — SIGNIFICANT CHANGE UP
PLATELET # BLD AUTO: SIGNIFICANT CHANGE UP K/UL (ref 150–400)
POIKILOCYTOSIS BLD QL AUTO: SLIGHT — SIGNIFICANT CHANGE UP
POLYCHROMASIA BLD QL SMEAR: SLIGHT — SIGNIFICANT CHANGE UP
POTASSIUM SERPL-MCNC: 4 MMOL/L — SIGNIFICANT CHANGE UP (ref 3.5–5.3)
POTASSIUM SERPL-SCNC: 4 MMOL/L — SIGNIFICANT CHANGE UP (ref 3.5–5.3)
PROT SERPL-MCNC: 5.9 G/DL — LOW (ref 6.6–8.7)
RBC # BLD: 3.22 M/UL — LOW (ref 3.8–5.2)
RBC # FLD: 14.5 % — SIGNIFICANT CHANGE UP (ref 10.3–14.5)
RBC BLD AUTO: ABNORMAL
SMUDGE CELLS # BLD: PRESENT — SIGNIFICANT CHANGE UP
SODIUM SERPL-SCNC: 136 MMOL/L — SIGNIFICANT CHANGE UP (ref 135–145)
T VAGINALIS SPEC QL WET PREP: SIGNIFICANT CHANGE UP
VARIANT LYMPHS # BLD: 1.8 % — SIGNIFICANT CHANGE UP (ref 0–6)
WBC # BLD: 13.39 K/UL — HIGH (ref 3.8–10.5)
WBC # FLD AUTO: 13.39 K/UL — HIGH (ref 3.8–10.5)

## 2024-10-12 RX ORDER — FAMOTIDINE 40 MG
20 TABLET ORAL ONCE
Refills: 0 | Status: COMPLETED | OUTPATIENT
Start: 2024-10-12 | End: 2024-10-12

## 2024-10-12 RX ADMIN — Medication 1 TABLET(S): at 13:47

## 2024-10-12 RX ADMIN — Medication 20 MILLIGRAM(S): at 23:50

## 2024-10-12 NOTE — PROGRESS NOTE ADULT - NSPROGADDITIONALINFOA_GEN_ALL_CORE
PEGGYM Fellow Addendum    23y  with di-di twin gestation at 20wks by LMP who presents to L&D for vaginal bleeding and cramping with US notable for anhydramnios and concerns for PPROM; HD#2.    Patient presenting with vaginal bleeding. Minimal amount of dark red blood on sanitary pad overnight.  Cervix 1.5/50/-3 on SVE 10/.    Previable status ressucitation is not indicated at this gestational age. Betamethasone and magnesium cannot be offered until 22 weeks gestational age.    Di/Di twin gestation. Twin A noted to be vertex presentation, posterior placenta, anhydramnios. Twin B noted to be breech presentation, fundal placenta. MVP 4.7cm. Growth concordant.    Patient with occasional CTX. SVE 1.5/50/-3. Continuous toco monitoring.    Anhydramnios of twin A. Discussed in setting of bleeding and dilation likely secondary to rupture of membranes. Discussed poor prognosis of PPROM and 2nd trimester anhydramios. Discussed expectant management verses termination of pregnancy by IOL or dilation and evacuation. Discussed risks and benefits of expectant management including risks of chorioamnionitis and PTD. Discussed if she were to go into spontaneous  birth in some instances delivery for baby B can be delayed, however this would depend on clincal course. A bedside ultrasound was performed with  for A 140 for B, Baby A was anhydramios and vertex and B breech with MVP 4.7cm.  At this time patient would like to proceed with expectant management. Recommend serial abdominal exam every shift. Abdomen nontender overnight. Patient remains afebrile. Further plan pending progress and MFM Attending evaluation    Carmelo DERASM Fellow  Discussed with Dr Menjivar

## 2024-10-12 NOTE — PROGRESS NOTE ADULT - PROBLEM SELECTOR PLAN 1
Patient presenting with vaginal bleeding. Minimal amount of dark red blood on sanitary pad overnight.  Cervix 1.5/50/-3 on SVE 10/11.

## 2024-10-13 LAB
ALBUMIN SERPL ELPH-MCNC: 3.3 G/DL — SIGNIFICANT CHANGE UP (ref 3.3–5.2)
ALP SERPL-CCNC: 87 U/L — SIGNIFICANT CHANGE UP (ref 40–120)
ALT FLD-CCNC: 9 U/L — SIGNIFICANT CHANGE UP
ANION GAP SERPL CALC-SCNC: 13 MMOL/L — SIGNIFICANT CHANGE UP (ref 5–17)
APPEARANCE UR: CLEAR — SIGNIFICANT CHANGE UP
APTT BLD: 25.6 SEC — SIGNIFICANT CHANGE UP (ref 24.5–35.6)
AST SERPL-CCNC: 13 U/L — SIGNIFICANT CHANGE UP
BACTERIA # UR AUTO: NEGATIVE /HPF — SIGNIFICANT CHANGE UP
BASOPHILS # BLD AUTO: 0.03 K/UL — SIGNIFICANT CHANGE UP (ref 0–0.2)
BASOPHILS NFR BLD AUTO: 0.2 % — SIGNIFICANT CHANGE UP (ref 0–2)
BILIRUB SERPL-MCNC: 0.3 MG/DL — LOW (ref 0.4–2)
BILIRUB UR-MCNC: NEGATIVE — SIGNIFICANT CHANGE UP
BUN SERPL-MCNC: 5.3 MG/DL — LOW (ref 8–20)
CALCIUM SERPL-MCNC: 8.9 MG/DL — SIGNIFICANT CHANGE UP (ref 8.4–10.5)
CHLORIDE SERPL-SCNC: 101 MMOL/L — SIGNIFICANT CHANGE UP (ref 96–108)
CO2 SERPL-SCNC: 22 MMOL/L — SIGNIFICANT CHANGE UP (ref 22–29)
COLOR SPEC: YELLOW — SIGNIFICANT CHANGE UP
CREAT SERPL-MCNC: 0.44 MG/DL — LOW (ref 0.5–1.3)
DIFF PNL FLD: ABNORMAL
EGFR: 139 ML/MIN/1.73M2 — SIGNIFICANT CHANGE UP
EOSINOPHIL # BLD AUTO: 0.03 K/UL — SIGNIFICANT CHANGE UP (ref 0–0.5)
EOSINOPHIL NFR BLD AUTO: 0.2 % — SIGNIFICANT CHANGE UP (ref 0–6)
FIBRINOGEN PPP-MCNC: 571 MG/DL — HIGH (ref 200–450)
GLUCOSE SERPL-MCNC: 94 MG/DL — SIGNIFICANT CHANGE UP (ref 70–99)
GLUCOSE UR QL: NEGATIVE MG/DL — SIGNIFICANT CHANGE UP
HCT VFR BLD CALC: 28.4 % — LOW (ref 34.5–45)
HGB BLD-MCNC: 9.8 G/DL — LOW (ref 11.5–15.5)
IMM GRANULOCYTES NFR BLD AUTO: 1 % — HIGH (ref 0–0.9)
KETONES UR-MCNC: NEGATIVE MG/DL — SIGNIFICANT CHANGE UP
LEUKOCYTE ESTERASE UR-ACNC: ABNORMAL
LYMPHOCYTES # BLD AUTO: 13.8 % — SIGNIFICANT CHANGE UP (ref 13–44)
LYMPHOCYTES # BLD AUTO: 2.12 K/UL — SIGNIFICANT CHANGE UP (ref 1–3.3)
MCHC RBC-ENTMCNC: 29.9 PG — SIGNIFICANT CHANGE UP (ref 27–34)
MCHC RBC-ENTMCNC: 34.5 GM/DL — SIGNIFICANT CHANGE UP (ref 32–36)
MCV RBC AUTO: 86.6 FL — SIGNIFICANT CHANGE UP (ref 80–100)
MONOCYTES # BLD AUTO: 0.86 K/UL — SIGNIFICANT CHANGE UP (ref 0–0.9)
MONOCYTES NFR BLD AUTO: 5.6 % — SIGNIFICANT CHANGE UP (ref 2–14)
NEUTROPHILS # BLD AUTO: 12.17 K/UL — HIGH (ref 1.8–7.4)
NEUTROPHILS NFR BLD AUTO: 79.2 % — HIGH (ref 43–77)
NITRITE UR-MCNC: NEGATIVE — SIGNIFICANT CHANGE UP
PH UR: 7.5 — SIGNIFICANT CHANGE UP (ref 5–8)
PLATELET # BLD AUTO: 233 K/UL — SIGNIFICANT CHANGE UP (ref 150–400)
POTASSIUM SERPL-MCNC: 3.5 MMOL/L — SIGNIFICANT CHANGE UP (ref 3.5–5.3)
POTASSIUM SERPL-SCNC: 3.5 MMOL/L — SIGNIFICANT CHANGE UP (ref 3.5–5.3)
PROT SERPL-MCNC: 6.2 G/DL — LOW (ref 6.6–8.7)
PROT UR-MCNC: NEGATIVE MG/DL — SIGNIFICANT CHANGE UP
RBC # BLD: 3.28 M/UL — LOW (ref 3.8–5.2)
RBC # FLD: 14.4 % — SIGNIFICANT CHANGE UP (ref 10.3–14.5)
RBC CASTS # UR COMP ASSIST: 0 /HPF — SIGNIFICANT CHANGE UP (ref 0–4)
SODIUM SERPL-SCNC: 136 MMOL/L — SIGNIFICANT CHANGE UP (ref 135–145)
SP GR SPEC: 1 — SIGNIFICANT CHANGE UP (ref 1–1.03)
SQUAMOUS # UR AUTO: 2 /HPF — SIGNIFICANT CHANGE UP (ref 0–5)
UROBILINOGEN FLD QL: 1 MG/DL — SIGNIFICANT CHANGE UP (ref 0.2–1)
WBC # BLD: 15.36 K/UL — HIGH (ref 3.8–10.5)
WBC # FLD AUTO: 15.36 K/UL — HIGH (ref 3.8–10.5)
WBC UR QL: 2 /HPF — SIGNIFICANT CHANGE UP (ref 0–5)

## 2024-10-13 PROCEDURE — 99232 SBSQ HOSP IP/OBS MODERATE 35: CPT

## 2024-10-13 RX ORDER — ONDANSETRON HCL/PF 4 MG/2 ML
4 VIAL (ML) INJECTION ONCE
Refills: 0 | Status: COMPLETED | OUTPATIENT
Start: 2024-10-13 | End: 2024-10-13

## 2024-10-13 RX ORDER — PRENATAL VIT,CAL 76/IRON/FOLIC 29 MG-1 MG
1 TABLET ORAL DAILY
Refills: 0 | Status: DISCONTINUED | OUTPATIENT
Start: 2024-10-13 | End: 2024-10-14

## 2024-10-13 RX ADMIN — Medication 4 MILLIGRAM(S): at 07:47

## 2024-10-13 RX ADMIN — Medication 4 MILLIGRAM(S): at 00:00

## 2024-10-13 RX ADMIN — Medication 1 TABLET(S): at 15:07

## 2024-10-13 NOTE — PROGRESS NOTE ADULT - NSPROGADDITIONALINFOA_GEN_ALL_CORE
MFM Fellow Addendum    23y  with di-di twin gestation at 20w1d by LMP who presents to L&D for vaginal bleeding and cramping with US notable for anhydramnios and concerns for PPROM; HD#3.    Patient presenting with vaginal bleeding. No bleeding overnight Cervix 1.5/50/-3 on SVE 10/11.    Previable status ressucitation is not indicated at this gestational age. Betamethasone and magnesium cannot be offered until 22 weeks gestational age.    Di/Di twin gestation. Twin A noted to be vertex presentation, posterior placenta, anhydramnios. Twin B noted to be breech presentation, fundal placenta. MVP 4.7cm. Growth concordant.    Patient with no ctx anymore. SVE 1.5/50/-3.     Anhydramnios of twin A. Discussed in setting of bleeding and dilation may be secondary to PPROM, chronic abruption and placental insufficiency. Discussed poor prognosis of 2nd trimester anhydramios regardless of PPROM status. Discussed expectant management verses termination of pregnancy by IOL or dilation and evacuation. Discussed risks and benefits of expectant management including risks of chorioamnionitis and PTD. Previously was informed if she were to go into spontaneous  birth in some instances delivery for baby B can be delayed, however this would depend on clincal course. A bedside ultrasound was performed with  for A 144 for B, Baby A was anhydramios with bladder visualized and vertex and B breech with MVP 4.7cm.  At this time patient would like to proceed with expectant management. Abdomen nontender overnight. Patient remains afebrile. Further plan pending progress and MFM Attending evaluation    Carmelo DERASM Fellow  Discussed with Dr Mulligan

## 2024-10-13 NOTE — PROGRESS NOTE ADULT - PROBLEM SELECTOR PLAN 1
Patient presenting with vaginal bleeding. Denies vaginal bleeding overnight.  Cervix 1.5/50/-3 on SVE 10/11.

## 2024-10-13 NOTE — CHART NOTE - NSCHARTNOTEFT_GEN_A_CORE
Called to bedside for patient counseling in the setting of vaginal bleeding and cramping at 19w+ with US notable for anhydramnios and concerns for PPROM. Discussed management options including expectant vs TOP. Discussed outcomes in the event of PPROM including previable labor and/or development of chorioamnionitis. Patient vital discussed with plan to repeat in 2 hours, continue serial abdominal exams overnight and repeat CBC at 4AM. Patient to remained on toco overnight, denies current cramping, states vaginal bleeding decreased. Questions of patient and support persons answered. Discussed with Dr. Rivas.
Patient examined to assess for uterine tenderness. Uterus nontender. Remains afebrile. Without clinical signs of chorioamnionitis. +acid reflux and nausea - zofran and pepcid administered. Without contractions on tocometer. Continue to monitor.
Patient assessment at bedside. Sleeping comfortably.   Denies fevers/sweats/chills/nausea and vomiting.   Denies LOF and abdominal cramp.   States some continued spotting on vaginal pad, similar to previous days.   Afebrile, HR 60-80s. Abdomen nontender.   Continue q4hr vitals and expectant management.
Patient examined at bedside. Endorses some vaginal spotting.    Vital Signs Last 24 Hrs  T(C): 37.1 (11 Oct 2024 22:02), Max: 37.3 (11 Oct 2024 19:14)  T(F): 98.78 (11 Oct 2024 22:02), Max: 99.14 (11 Oct 2024 19:14)  HR: 77 (12 Oct 2024 01:13) (77 - 120)  BP: 116/62 (12 Oct 2024 01:13) (106/57 - 135/82)  RR: 19 (11 Oct 2024 22:02) (14 - 20)  SpO2: 99% (11 Oct 2024 13:07) (99% - 99%)    Parameters below as of 11 Oct 2024 15:05  Patient On (Oxygen Delivery Method): room air    Gen: AAOx3  Abd: No fundal tenderness on palpation.  Pelvic: Minimal amount of blood noted on vaginal pad.    A/P: 23y  with di-di twin gestation at 20wks by LMP who presents to L&D for vaginal bleeding and cramping with US notable for anhydramnios and concerns for PPROM.  - continue to monitor vitals q4hrs  - FU AM CBC

## 2024-10-13 NOTE — PROGRESS NOTE ADULT - ATTENDING COMMENTS
Agree with assessment and plan documented above.   Patient examined and counseled with her mother at bedside. In summary:   Patient is a 23 year old  at less than 20 weeks gestation with spontaneous Dichorionic Diamniotic twin gestation. Pregnancy complicated by obesity. She reports that she has been spotting the entire pregnancy and was told that this was normal. She presented after a heavier episode of bleeding. At that time, there was a small amount of blood in the vagina but no active bleeding on exam. The patient reported mild cramping that resolved, she denied any loss of fluid (just reported mucus-y discharge). She is not yet feeling fetal movement.     On ultrasound evaluation, baby A is vertex with posterior placenta and anhydramnios. Baby B is vertex with anterior placenta and normal fluid. Intertwin membrane clearly visible.   We reviewed the following differential diagnosis:   1-  previable premature rupture of membranes- we discussed that this has an exceptionally poor prognosis at this early gestational age. Although there is a possibility of delivery of baby A and prolonged pregnancy for baby B, this is not common. There is a risk of intramniotic infection which may lead to sepsis, heavy vaginal bleeding, need for urgent or emergent delivery with possible need for medical or surgical intervention.     2- Placental abruption- given that the patient has been spotting for prolonged period of time, she may have a chronic placental abruption. I explained to the patient and her mother what this could be and although bleeding may stop, it may lead to placental insufficiency which could present with anhydramnios. Risks include worsening bleeding with potential for fetomaternal hemorrhage, intrauterine fetal demise, consumptive coagulopathy if significant and acute.     PPROM and placental abruption may also occur together, with either one being the inciting factor.     3- Fetal status- it is possible that anhydramnios is due to fetal structural or genetic etiology. Low suspicion for renal agenesis as kidneys and bladder are clearly visualized. It is also reassuring that the fetus had normal amniotic fluid at 17 week outpatient ultrasound.     Management options discussed including- interruption of the pregnancy (given the early gestational age/previability of anhydramnios, poor prognosis for baby A with inability to develop fetal lungs, limb contractures, potter sequence- if ruptured membranes, possibility for systemic infection with poor maternal or  outcome). The patient declines this option as this is a highly desired pregnancy. Reviewed options for expectant management: either as an inpatient or an outpatient. There are no available interventions at this gestational age in this setting. While inpatient management is not unreasonable given the possible complications associated with this condition, there is no preventative treatment available. Hospital course would include vital signs and daily US to assess viability. Patient may be a candidate for outpatient surveillance due to stability without contractions on tocometer, no signs or symptoms of intraamniotic infection. Would have to be followed closely with frequent laboratory and clinical assessments as well as close attention to symptoms of possible infection or worsening bleed.     Patient desires to speak to her family and discuss their wishes together. Because she is clinically stable with normal vital signs and benign physical exam, will transfer to antepartum unit. Patient to notify nursing team of any change in status.     Suzy Mulligan MD  Maternal Fetal Medicine Agree with assessment and plan documented above.   Patient examined and counseled with her mother at bedside. In summary:   Patient is a 23 year old  at less than 20 weeks gestation with spontaneous Dichorionic Diamniotic twin gestation. Pregnancy complicated by nulliparity and twin gestation. She reports that she has been spotting the entire pregnancy and was told that this was normal. She presented after an episode of heavier bleeding. At that time, there was a small amount of blood in the vagina but no active bleeding on exam. The patient reported mild cramping that resolved, she denied any loss of fluid (just reported mucus-y discharge). She is not yet feeling fetal movement. On exam per inpatient team, she was 1.5 cm dilated.     On ultrasound evaluation, baby A is vertex with posterior placenta and anhydramnios. Baby B is vertex with anterior placenta and normal fluid. Intertwin membrane clearly visible.   We reviewed the following differential diagnosis:   1-  previable premature rupture of membranes- we discussed that this has an exceptionally poor prognosis at this early gestational age. Although there is a possibility of delivery of baby A and prolonged pregnancy for baby B, this is not common. There is a risk of intramniotic infection which may lead to sepsis, heavy vaginal bleeding, need for urgent or emergent delivery with possible need for medical or surgical intervention.     2- Placental abruption- given that the patient has been spotting for prolonged period of time, she may have a chronic placental abruption. I explained to the patient and her mother what this could be and although bleeding may stop, it may lead to placental insufficiency which could present with anhydramnios. Risks include worsening bleeding with potential for fetomaternal hemorrhage, intrauterine fetal demise, consumptive coagulopathy if significant and acute.     PPROM and placental abruption may also occur together, with either one being the inciting factor.     3- Fetal status- it is possible that anhydramnios is due to fetal structural or genetic etiology. Low suspicion for renal agenesis as kidneys and bladder are clearly visualized. It is also reassuring that the fetus had normal amniotic fluid at 17 week outpatient ultrasound.     Management options discussed including- interruption of the pregnancy (given the early gestational age/previability of anhydramnios, poor prognosis for baby A with inability to develop fetal lungs, limb contractures, potter sequence- if ruptured membranes, possibility for systemic infection with poor maternal or  outcome). The patient declines this option as this is a highly desired pregnancy. Reviewed options for expectant management: either as an inpatient or an outpatient. There are no available interventions at this gestational age in this setting. While inpatient management is not unreasonable given the possible complications associated with this condition, there is no preventative treatment available. Hospital course would include vital signs and daily US to assess viability. Patient may be a candidate for outpatient surveillance due to stability without contractions on tocometer, no signs or symptoms of intraamniotic infection. Would have to be followed closely with frequent laboratory and clinical assessments as well as close attention to symptoms of possible infection or worsening bleed.     Patient desires to speak to her family and discuss their wishes together. Because she is clinically stable with normal vital signs and benign physical exam, will transfer to antepartum unit. Patient to notify nursing team of any change in status.     Suzy Mulligan MD  Maternal Fetal Medicine

## 2024-10-14 ENCOUNTER — TRANSCRIPTION ENCOUNTER (OUTPATIENT)
Age: 24
End: 2024-10-14

## 2024-10-14 VITALS
SYSTOLIC BLOOD PRESSURE: 117 MMHG | DIASTOLIC BLOOD PRESSURE: 76 MMHG | TEMPERATURE: 98 F | HEART RATE: 78 BPM | OXYGEN SATURATION: 98 % | RESPIRATION RATE: 18 BRPM

## 2024-10-14 DIAGNOSIS — Z3A.19 19 WEEKS GESTATION OF PREGNANCY: ICD-10-CM

## 2024-10-14 LAB
BILIRUB SERPL-MCNC: <0.2 MG/DL — LOW (ref 0.4–2)
CREAT SERPL-MCNC: 0.37 MG/DL — LOW (ref 0.5–1.3)
EGFR: 145 ML/MIN/1.73M2 — SIGNIFICANT CHANGE UP
INR BLD: 1.02 RATIO — SIGNIFICANT CHANGE UP (ref 0.85–1.16)
MELD SCORE WITH DIALYSIS: 20 POINTS — SIGNIFICANT CHANGE UP
MELD SCORE WITHOUT DIALYSIS: 7 POINTS — SIGNIFICANT CHANGE UP
PROTHROM AB SERPL-ACNC: 11.5 SEC — SIGNIFICANT CHANGE UP (ref 9.9–13.4)
SODIUM SERPL-SCNC: 137 MMOL/L — SIGNIFICANT CHANGE UP (ref 135–145)

## 2024-10-14 PROCEDURE — 99232 SBSQ HOSP IP/OBS MODERATE 35: CPT

## 2024-10-14 PROCEDURE — 90656 IIV3 VACC NO PRSV 0.5 ML IM: CPT

## 2024-10-14 PROCEDURE — 86900 BLOOD TYPING SEROLOGIC ABO: CPT

## 2024-10-14 PROCEDURE — 82247 BILIRUBIN TOTAL: CPT

## 2024-10-14 PROCEDURE — 36415 COLL VENOUS BLD VENIPUNCTURE: CPT

## 2024-10-14 PROCEDURE — 85384 FIBRINOGEN ACTIVITY: CPT

## 2024-10-14 PROCEDURE — 85610 PROTHROMBIN TIME: CPT

## 2024-10-14 PROCEDURE — 80053 COMPREHEN METABOLIC PANEL: CPT

## 2024-10-14 PROCEDURE — 85730 THROMBOPLASTIN TIME PARTIAL: CPT

## 2024-10-14 PROCEDURE — 86850 RBC ANTIBODY SCREEN: CPT

## 2024-10-14 PROCEDURE — 84295 ASSAY OF SERUM SODIUM: CPT

## 2024-10-14 PROCEDURE — 86901 BLOOD TYPING SEROLOGIC RH(D): CPT

## 2024-10-14 PROCEDURE — 82565 ASSAY OF CREATININE: CPT

## 2024-10-14 PROCEDURE — 85025 COMPLETE CBC W/AUTO DIFF WBC: CPT

## 2024-10-14 PROCEDURE — 87800 DETECT AGNT MULT DNA DIREC: CPT

## 2024-10-14 PROCEDURE — 81001 URINALYSIS AUTO W/SCOPE: CPT

## 2024-10-14 RX ORDER — PRENATAL VIT,CAL 76/IRON/FOLIC 29 MG-1 MG
1 TABLET ORAL
Qty: 0 | Refills: 0 | DISCHARGE
Start: 2024-10-14

## 2024-10-14 RX ADMIN — INFLUENZA VIRUS VACCINE 0.5 MILLILITER(S): 15; 15; 15; 15 SUSPENSION INTRAMUSCULAR at 12:24

## 2024-10-14 RX ADMIN — Medication 1 TABLET(S): at 12:23

## 2024-10-14 NOTE — PROGRESS NOTE ADULT - PROBLEM SELECTOR PLAN 2
Di/Di twin gestation. Twin A noted to be vertex presentation, posterior placenta, anhydramnios. Twin B noted to be vertex presentation, fundal placenta. MVP 4.7cm.
Di/Di twin gestation. Twin A noted to be vertex presentation, posterior placenta, anhydramnios. Twin B noted to be breech presentation, fundal placenta. MVP >5cm.
Di/Di twin gestation. Twin A noted to be vertex presentation, posterior placenta, anhydramnios. Twin B noted to be vertex presentation, fundal placenta. MVP 4.7cm.

## 2024-10-14 NOTE — PROGRESS NOTE ADULT - PROBLEM SELECTOR PLAN 5
Continue with PNV. Daily FH.
Continue with PNV. Daily FH. Continuos toco monitoring
Continue with PNV. Daily FH. Continous toco monitoring.

## 2024-10-14 NOTE — DISCHARGE NOTE ANTEPARTUM - CARE PLAN
Principal Discharge DX:	Anhydramnios  Assessment and plan of treatment:	Patient presented to L&D for vaginal bleeding. Was found to have anhydramnios of Baby A suspicious for PPROM. Patient would like to continue with outpatient expectant management for PPROM in pre-viable pregnancy. Patient is to monitor Temp at home daily. Return to L&D if patient develops a fever of 100.4F or higher. Patient to follow up with OB and MFM providers on a weekly basis. Patient to return to L&D if she develops painful contractions and vaginal bleeding.  Secondary Diagnosis:	Vaginal bleeding  Assessment and plan of treatment:	Patient presenting to L&D with vaginal bleeding. Now with intermittent pink spotting. Patient to monitor to L&D if vaginal bleeding progresses, if she experiences leakage of fluids, and it she develops painful contractions.   1

## 2024-10-14 NOTE — DISCHARGE NOTE ANTEPARTUM - PLAN OF CARE
Patient presenting to L&D with vaginal bleeding. Now with intermittent pink spotting. Patient to monitor to L&D if vaginal bleeding progresses, if she experiences leakage of fluids, and it she develops painful contractions. Patient presented to L&D for vaginal bleeding. Was found to have anhydramnios of Baby A suspicious for PPROM. Patient would like to continue with outpatient expectant management for PPROM in pre-viable pregnancy. Patient is to monitor Temp at home daily. Return to L&D if patient develops a fever of 100.4F or higher. Patient to follow up with OB and MFM providers on a weekly basis. Patient to return to L&D if she develops painful contractions and vaginal bleeding.

## 2024-10-14 NOTE — DISCHARGE NOTE ANTEPARTUM - MEDICATION SUMMARY - MEDICATIONS TO TAKE
I will START or STAY ON the medications listed below when I get home from the hospital:    Prenatal Multivitamins with Folic Acid 1 mg oral tablet  -- 1 tab(s) by mouth once a day  -- Indication: For  nutrition

## 2024-10-14 NOTE — DISCHARGE NOTE ANTEPARTUM - PROVIDER TOKENS
FREE:[LAST:[[facility]],PHONE:[phone]],FAX:[(   )    -],FOLLOWUP:[1 week],ESTABLISHEDPATIENT:[T]],FREE:[LAST:[[facility]],PHONE:[phone]],FAX:[(   )    -],ADDRESS:[[address] [place] [address], [place] [address]],FOLLOWUP:[1 week],ESTABLISHEDPATIENT:[T]]

## 2024-10-14 NOTE — PROGRESS NOTE ADULT - PROBLEM SELECTOR PLAN 1
Patient presenting with vaginal bleeding. Reports passing a clot overnight. Cervix 1.5/50/-3 on SVE 10/11. Speculum exam today with pink tinged mucus.  No active bleeding. Will continue to monitor. Patient presenting with vaginal bleeding. Reports passing a clot overnight. Cervix 1.5/50/-3 on SVE 10/11. Speculum exam today with pink tinged mucus.  No active bleeding. Given patient is pre-viable with suspected PPROM desiring expectant management, discussed possibility of discharge to home with weekly MFM and OB monitoring. Patient with spotting throughout hospital course with no active bleeding. Denies cramping and contractions. Discussed risks of benefits of outpatient expectant management. Will prepare patient for discharge to home.

## 2024-10-14 NOTE — PROGRESS NOTE ADULT - ASSESSMENT
23y  with di-di twin gestation at 20w1d by LMP who presents to L&D for vaginal bleeding and cramping with US notable for anhydramnios and concerns for PPROM; HD#3.
23y  with di-di twin gestation at 20wks by LMP who presents to L&D for vaginal bleeding and cramping with US notable for anhydramnios and concerns for PPROM; HD#2.    Will be discussed with MFM fellow, Dr. Rivas
23y  with di-di twin gestation at 20w0d by LMP who presents to L&D for vaginal bleeding and cramping with US notable for anhydramnios and concerns for PPROM; HD#4.

## 2024-10-14 NOTE — PROGRESS NOTE ADULT - TIME BILLING
history taking, chart review, discussion, coordination of care, documentation
chart review, ultrasound, patient counseling, coordination of care, documentation

## 2024-10-14 NOTE — PROGRESS NOTE ADULT - PROBLEM SELECTOR PROBLEM 2
Dichorionic diamniotic twin gestation

## 2024-10-14 NOTE — PROGRESS NOTE ADULT - ATTENDING COMMENTS
23 year old  at 20 weeks gestation who presented with vaginal bleeding, found to have anhydramnios of baby A. We discussed differential diagnosis including placental abruption,  premature rupture of membranes, inevitable miscarriage. We discussed poor prognosis with early onset anhydramnios with lack of development of fetal lungs and associated sequelae with low fluid. She is aware that there are no available interventions at this gestational age and desires continued expectant management. She is feeling overall well this morning without any signs or symptoms of infection. She is a candidate for discharge to home with close outpatient surveillance. Discussed the recommendation for weekly visits with OB, frequent ultrasounds, and daily temperatures at home. We discussed indications to present to the hospital which include heavy vaginal bleeding, foul smelling vaginal discharge, systemic signs of infection or pain with concern for  labor.     Will facilitate timely follow up with outpatient providers. All questions answered, patient expressed understanding.     Suzy Mulligan MD  Maternal Fetal Medicine

## 2024-10-14 NOTE — DISCHARGE NOTE ANTEPARTUM - HOSPITAL COURSE
23y  with di-di twin gestation at 20w0d by LMP who presents to L&D for vaginal bleeding and cramping with US notable for anhydramnios and concerns for PPROM. Patient with intermittent pink spotting throughout hospital course. Patient requesting outpatient expectant management. Denies fevers, chills, cramping, contractions. Patient has remained afebrile throughout hospital course. VSS. Labs stable upon discharge. Patient will follow up with OB provider at [facility] and with [facility] on a weekly basis. Strict return precautions given.

## 2024-10-14 NOTE — DISCHARGE NOTE ANTEPARTUM - CARE PROVIDER_API CALL
[facility],   Phone: [phone]  Fax: (   )    -  Established Patient  Follow Up Time: 1 week    [facility],   [address] [place] [address]  Phone: [phone]  Fax: (   )    -  Established Patient  Follow Up Time: 1 week

## 2024-10-14 NOTE — PROGRESS NOTE ADULT - PROBLEM SELECTOR PLAN 4
Twin A noted to be anhydramnios. Likely secondary to rupture of membranes. Discussed poor prognosis of PPROM. Discussed expectant management verses termination of pregnancy and dilation and evacuation. Discussed risks and benefits of expectant management including risks of chorioamnionitis and PTD. Discussed option of management via interval pregnancy but low likelihood of success. At this time patient would like to proceed with expectant management. Recommend serial abdominal exam every shift. Abdomen nontender overnight. Patient remains afebrile. FU AM CBC.
Twin A noted to be anhydramnios. Likely secondary to rupture of membranes. Discussed poor prognosis of PPROM. Discussed expectant management verses termination of pregnancy and dilation and evacuation. Discussed risks and benefits of expectant management including risks of chorioamnionitis and PTD. Discussed option of management via interval pregnancy but low likelihood of success. At this time patient would like to proceed with expectant management. Recommend serial abdominal exam every shift. Abdomen nontender this morning. Patient remains afebrile. WBC: 16 > 13 > 15.
Twin A noted to be anhydramnios. Likely secondary to rupture of membranes. Discussed poor prognosis of PPROM. Discussed expectant management verses termination of pregnancy and dilation and evacuation. Discussed risks and benefits of expectant management including risks of chorioamnionitis and PTD. Discussed option of management via interval pregnancy but low likelihood of success. At this time patient would like to proceed with expectant management. Recommend serial abdominal exam every shift. Abdomen nontender this morning. Patient remains afebrile. WBC: 16 > 13

## 2024-10-14 NOTE — DISCHARGE NOTE ANTEPARTUM - PATIENT PORTAL LINK FT
You can access the FollowMyHealth Patient Portal offered by [facility] by registering at the following website: [email or web address]. By joining [facility]’s FollowMyHealth portal, you will also be able to view your health information using other applications (apps) compatible with our system.

## 2024-10-14 NOTE — PROGRESS NOTE ADULT - PROBLEM SELECTOR PLAN 3
Patient with occasional CTX. SVE 1.5/50/-3. Continuous toco monitoring. No contractions on toco now.
Patient with occasional CTX. SVE 1.5/50/-3. Continuous toco monitoring.
Patient with occasional CTX. SVE 1.5/50/-3 (10/11). No contractions on toco now. Patient denies contractions and cramping overnight.

## 2024-10-14 NOTE — PROGRESS NOTE ADULT - SUBJECTIVE AND OBJECTIVE BOX
SPENCER THAKKAR  [facility] DELV OBWR 07  23y  with di-di twin gestation at 20w1d by LMP who presents to L&D for vaginal bleeding and cramping with US notable for anhydramnios and concerns for PPROM; HD#3.    Patient denies vaginal bleeding overnight. Reports one episode of vomiting last night after eating. Reports intermittent nausea. Denies contractions and leakage of fluids. Patient denies fevers, chills, sweats. no other concerns at this time.      Vital Signs:  Vital Signs Last 24 Hrs  T(C): 37.0 (13 Oct 2024 07:33), Max: 37.3 (12 Oct 2024 15:17)  T(F): 98.6 (13 Oct 2024 07:33), Max: 99.14 (12 Oct 2024 15:17)  HR: 83 (13 Oct 2024 07:33) (76 - 86)  BP: 102/67 (13 Oct 2024 07:33) (99/55 - 129/80)  RR: 16 (13 Oct 2024 07:33) (16 - 18)      Physical Exam:  General: Adult female in NAD  Abdomen: soft, non-tender, gravid uterus  Ext: No cyanosis, edema or calf tenderness  Skin: No rashes or lesions on exposed skin  Neuro: Grossly intact  Bedside Sono:   Fetus A: vertex presentation, anhydramnios, FH 145bpm  Fetus B: vertex presentation, MVP 4.7cm. FH 141bpm    Labs:                          9.6    13.39 )-----------( Clumped    ( 12 Oct 2024 04:10 )             27.7     10-12    136  |  105  |  5.2[L]  ----------------------------<  77  4.0   |  21.0[L]  |  0.41[L]    Ca    8.8      12 Oct 2024 04:10    TPro  5.9[L]  /  Alb  3.1[L]  /  TBili  0.2[L]  /  DBili  x   /  AST  14  /  ALT  8   /  AlkPhos  82  10-12    PTT - ( 11 Oct 2024 15:46 )  PTT:26.1 sec        MEDICATIONS  (STANDING):  influenza   Vaccine 0.5 milliLiter(s) IntraMuscular once  prenatal multivitamin 1 Tablet(s) Oral daily    
23y  with di-di twin gestation at 20wks by LMP who presents to L&D for vaginal bleeding and cramping with US notable for anhydramnios and concerns for PPROM; HD#2.    SUBJECTIVE:  Endorses some vaginal spotting overnight.  Denies cramping, denies LOF.  Denies fevers, sweats, chills, palpitations.    OBJECTIVE:  Vital Signs Last 24 Hrs  T(C): 36.7 (12 Oct 2024 01:13), Max: 37.3 (11 Oct 2024 19:14)  T(F): 98.06 (12 Oct 2024 01:13), Max: 99.14 (11 Oct 2024 19:14)  HR: 77 (12 Oct 2024 01:13) (77 - 120)  BP: 116/62 (12 Oct 2024 01:13) (106/57 - 135/82)  RR: 19 (12 Oct 2024 01:13) (14 - 20)  SpO2: 99% (11 Oct 2024 13:07) (99% - 99%)    Parameters below as of 11 Oct 2024 15:05  Patient On (Oxygen Delivery Method): room air    Gen: AAOx3  Abd: soft, gravid, nontender  Pelvic: minimal spotting noted on vaginal pad    Silas: no CTX    LABS: AM labs pending
SPENCER THAKKAR  [facility] 2EST  01  23y  with di-di twin gestation at 20w0d by LMP who presents to L&D for vaginal bleeding and cramping with US notable for anhydramnios and concerns for PPROM; HD#4.    Patient reports passing a blood clot the size of a lemon. Reports pink tinged mucus throughout the day. Denies fevers, chills, sweats, lightheadedness, dizziness, SOB, CP, abdominal pain. Denies contractions and LOF. Reports never feeling fetal movements. No other concerns at this time.      Vital Signs:  Vital Signs Last 24 Hrs  T(C): 36.7 (14 Oct 2024 07:14), Max: 37.3 (13 Oct 2024 15:21)  T(F): 98 (14 Oct 2024 07:14), Max: 99.1 (13 Oct 2024 15:21)  HR: 78 (14 Oct 2024 07:14) (68 - 96)  BP: 117/76 (14 Oct 2024 07:14) (100/63 - 117/76)  RR: 15 (14 Oct 2024 07:14) (15 - 18)  SpO2: 98% (14 Oct 2024 07:14) (95% - 98%)    Parameters below as of 14 Oct 2024 07:14  Patient On (Oxygen Delivery Method): room air          Physical Exam:  General: Adult female in NAD  Abdomen: soft, non-tender, gravid uterus  Pelvic: speculum exam with minimal pink tinged mucus in vaginal vault. No active bleeding from cervix. Cervix not visibly dilated.  Ext: No cyanosis, edema or calf tenderness  Skin: No rashes or lesions on exposed skin  Neuro: Grossly intact    Labs:                          9.8    15.36 )-----------( 233      ( 13 Oct 2024 14:07 )             28.4     10-14    137  |  x   |  x   ----------------------------<  x   x    |  x   |  0.37[L]    Ca    8.9      13 Oct 2024 14:07    TPro  x   /  Alb  x   /  TBili  <0.2[L]  /  DBili  x   /  AST  x   /  ALT  x   /  AlkPhos  x   10-14    PT/INR - ( 14 Oct 2024 04:00 )   PT: 11.5 sec;   INR: 1.02 ratio         PTT - ( 13 Oct 2024 14:07 )  PTT:25.6 sec        Radiology:    MEDICATIONS  (STANDING):  influenza   Vaccine 0.5 milliLiter(s) IntraMuscular once  prenatal multivitamin 1 Tablet(s) Oral daily    MEDICATIONS  (PRN):

## 2024-10-15 PROBLEM — Z87.19 PERSONAL HISTORY OF OTHER DISEASES OF THE DIGESTIVE SYSTEM: Chronic | Status: ACTIVE | Noted: 2024-10-11

## 2024-10-16 ENCOUNTER — INPATIENT (INPATIENT)
Facility: HOSPITAL | Age: 24
LOS: 1 days | Discharge: HOME | End: 2024-10-18
Attending: OBSTETRICS & GYNECOLOGY | Admitting: OBSTETRICS & GYNECOLOGY
Payer: COMMERCIAL

## 2024-10-16 VITALS
TEMPERATURE: 98 F | RESPIRATION RATE: 14 BRPM | SYSTOLIC BLOOD PRESSURE: 141 MMHG | DIASTOLIC BLOOD PRESSURE: 89 MMHG | HEART RATE: 137 BPM

## 2024-10-16 DIAGNOSIS — N93.9 ABNORMAL UTERINE AND VAGINAL BLEEDING, UNSPECIFIED: ICD-10-CM

## 2024-10-16 DIAGNOSIS — O42.919 PRETERM PREMATURE RUPTURE OF MEMBRANES, UNSPECIFIED AS TO LENGTH OF TIME BETWEEN RUPTURE AND ONSET OF LABOR, UNSPECIFIED TRIMESTER: ICD-10-CM

## 2024-10-16 DIAGNOSIS — O26.893 OTHER SPECIFIED PREGNANCY RELATED CONDITIONS, THIRD TRIMESTER: ICD-10-CM

## 2024-10-16 DIAGNOSIS — O26.899 OTHER SPECIFIED PREGNANCY RELATED CONDITIONS, UNSPECIFIED TRIMESTER: ICD-10-CM

## 2024-10-16 DIAGNOSIS — O30.049 TWIN PREGNANCY, DICHORIONIC/DIAMNIOTIC, UNSPECIFIED TRIMESTER: ICD-10-CM

## 2024-10-16 DIAGNOSIS — R03.0 ELEVATED BLOOD-PRESSURE READING, WITHOUT DIAGNOSIS OF HYPERTENSION: ICD-10-CM

## 2024-10-16 DIAGNOSIS — O60.00 PRETERM LABOR WITHOUT DELIVERY, UNSPECIFIED TRIMESTER: ICD-10-CM

## 2024-10-16 PROBLEM — Z86.19 PERSONAL HISTORY OF OTHER INFECTIOUS AND PARASITIC DISEASES: Chronic | Status: ACTIVE | Noted: 2024-10-11

## 2024-10-16 LAB
ALBUMIN SERPL ELPH-MCNC: 3.2 G/DL — LOW (ref 3.3–5.2)
ALP SERPL-CCNC: 97 U/L — SIGNIFICANT CHANGE UP (ref 40–120)
ALT FLD-CCNC: 8 U/L — SIGNIFICANT CHANGE UP
ANION GAP SERPL CALC-SCNC: 11 MMOL/L — SIGNIFICANT CHANGE UP (ref 5–17)
APTT BLD: 25.2 SEC — SIGNIFICANT CHANGE UP (ref 24.5–35.6)
AST SERPL-CCNC: 14 U/L — SIGNIFICANT CHANGE UP
BASOPHILS # BLD AUTO: 0.05 K/UL — SIGNIFICANT CHANGE UP (ref 0–0.2)
BASOPHILS NFR BLD AUTO: 0.2 % — SIGNIFICANT CHANGE UP (ref 0–2)
BILIRUB SERPL-MCNC: <0.2 MG/DL — LOW (ref 0.4–2)
BLD GP AB SCN SERPL QL: SIGNIFICANT CHANGE UP
BUN SERPL-MCNC: 3.5 MG/DL — LOW (ref 8–20)
CALCIUM SERPL-MCNC: 8.9 MG/DL — SIGNIFICANT CHANGE UP (ref 8.4–10.5)
CHLORIDE SERPL-SCNC: 105 MMOL/L — SIGNIFICANT CHANGE UP (ref 96–108)
CO2 SERPL-SCNC: 21 MMOL/L — LOW (ref 22–29)
CREAT SERPL-MCNC: 0.35 MG/DL — LOW (ref 0.5–1.3)
EGFR: 147 ML/MIN/1.73M2 — SIGNIFICANT CHANGE UP
EOSINOPHIL # BLD AUTO: 0.06 K/UL — SIGNIFICANT CHANGE UP (ref 0–0.5)
EOSINOPHIL NFR BLD AUTO: 0.3 % — SIGNIFICANT CHANGE UP (ref 0–6)
FIBRINOGEN PPP-MCNC: 627 MG/DL — HIGH (ref 200–450)
GLUCOSE SERPL-MCNC: 73 MG/DL — SIGNIFICANT CHANGE UP (ref 70–99)
HCT VFR BLD CALC: 33.8 % — LOW (ref 34.5–45)
HGB BLD-MCNC: 11.8 G/DL — SIGNIFICANT CHANGE UP (ref 11.5–15.5)
IMM GRANULOCYTES NFR BLD AUTO: 1.3 % — HIGH (ref 0–0.9)
INR BLD: 1.03 RATIO — SIGNIFICANT CHANGE UP (ref 0.85–1.16)
LYMPHOCYTES # BLD AUTO: 15.6 % — SIGNIFICANT CHANGE UP (ref 13–44)
LYMPHOCYTES # BLD AUTO: 3.25 K/UL — SIGNIFICANT CHANGE UP (ref 1–3.3)
MCHC RBC-ENTMCNC: 30.1 PG — SIGNIFICANT CHANGE UP (ref 27–34)
MCHC RBC-ENTMCNC: 34.9 GM/DL — SIGNIFICANT CHANGE UP (ref 32–36)
MCV RBC AUTO: 86.2 FL — SIGNIFICANT CHANGE UP (ref 80–100)
MONOCYTES # BLD AUTO: 1.19 K/UL — HIGH (ref 0–0.9)
MONOCYTES NFR BLD AUTO: 5.7 % — SIGNIFICANT CHANGE UP (ref 2–14)
NEUTROPHILS # BLD AUTO: 15.96 K/UL — HIGH (ref 1.8–7.4)
NEUTROPHILS NFR BLD AUTO: 76.9 % — SIGNIFICANT CHANGE UP (ref 43–77)
PLATELET # BLD AUTO: 279 K/UL — SIGNIFICANT CHANGE UP (ref 150–400)
POTASSIUM SERPL-MCNC: 4.1 MMOL/L — SIGNIFICANT CHANGE UP (ref 3.5–5.3)
POTASSIUM SERPL-SCNC: 4.1 MMOL/L — SIGNIFICANT CHANGE UP (ref 3.5–5.3)
PROT SERPL-MCNC: 6.3 G/DL — LOW (ref 6.6–8.7)
PROTHROM AB SERPL-ACNC: 11.6 SEC — SIGNIFICANT CHANGE UP (ref 9.9–13.4)
RBC # BLD: 3.92 M/UL — SIGNIFICANT CHANGE UP (ref 3.8–5.2)
RBC # FLD: 14.6 % — HIGH (ref 10.3–14.5)
SODIUM SERPL-SCNC: 137 MMOL/L — SIGNIFICANT CHANGE UP (ref 135–145)
T PALLIDUM AB TITR SER: NEGATIVE — SIGNIFICANT CHANGE UP
WBC # BLD: 20.79 K/UL — HIGH (ref 3.8–10.5)
WBC # FLD AUTO: 20.79 K/UL — HIGH (ref 3.8–10.5)

## 2024-10-16 PROCEDURE — 99222 1ST HOSP IP/OBS MODERATE 55: CPT

## 2024-10-16 RX ORDER — ONDANSETRON HCL/PF 4 MG/2 ML
4 VIAL (ML) INJECTION ONCE
Refills: 0 | Status: COMPLETED | OUTPATIENT
Start: 2024-10-16 | End: 2024-10-16

## 2024-10-16 RX ORDER — GLYCERIN 1.3 G/1
1 SUPPOSITORY RECTAL ONCE
Refills: 0 | Status: COMPLETED | OUTPATIENT
Start: 2024-10-16 | End: 2024-10-16

## 2024-10-16 RX ORDER — SODIUM CHLORIDE IRRIG SOLUTION 0.9 %
1000 SOLUTION, IRRIGATION IRRIGATION
Refills: 0 | Status: DISCONTINUED | OUTPATIENT
Start: 2024-10-16 | End: 2024-10-17

## 2024-10-16 RX ORDER — ACETAMINOPHEN 325 MG
1000 TABLET ORAL ONCE
Refills: 0 | Status: COMPLETED | OUTPATIENT
Start: 2024-10-16 | End: 2024-10-16

## 2024-10-16 RX ORDER — SODIUM CITRATE AND CITRIC ACID MONOHYDRATE 334; 500 MG/5ML; MG/5ML
15 SOLUTION ORAL EVERY 6 HOURS
Refills: 0 | Status: DISCONTINUED | OUTPATIENT
Start: 2024-10-16 | End: 2024-10-17

## 2024-10-16 RX ORDER — OXYTOCIN/RINGER'S LACTATE 20/500ML
167 PLASTIC BAG, INJECTION (ML) INTRAVENOUS
Qty: 30 | Refills: 0 | Status: COMPLETED | OUTPATIENT
Start: 2024-10-16 | End: 2024-10-16

## 2024-10-16 RX ORDER — CHLORHEXIDINE GLUCONATE ORAL RINSE 1.2 MG/ML
1 SOLUTION DENTAL DAILY
Refills: 0 | Status: DISCONTINUED | OUTPATIENT
Start: 2024-10-16 | End: 2024-10-17

## 2024-10-16 RX ADMIN — Medication 125 MILLILITER(S): at 22:51

## 2024-10-16 RX ADMIN — Medication 4 MILLIGRAM(S): at 23:00

## 2024-10-16 RX ADMIN — Medication 125 MILLILITER(S): at 07:11

## 2024-10-16 RX ADMIN — Medication 400 MILLIGRAM(S): at 06:50

## 2024-10-16 RX ADMIN — GLYCERIN 1 SUPPOSITORY(S): 1.3 SUPPOSITORY RECTAL at 13:23

## 2024-10-16 RX ADMIN — Medication 1000 MILLIGRAM(S): at 07:30

## 2024-10-16 RX ADMIN — Medication 400 MILLIGRAM(S): at 19:42

## 2024-10-16 NOTE — CONSULT NOTE ADULT - PROBLEM SELECTOR RECOMMENDATION 9
-20w2d GA by LMP today.  -Di/di twin gestation complicated by PPROM and anhydramnios of baby A.  -Baby A FH 140s  -Baby B FH 150s  -Continuous toco monitoring.  -FH every shift.

## 2024-10-16 NOTE — OB PROVIDER H&P - ASSESSMENT
A/P:   -Admit to L&D for PTL.  -Consent  -Admission labs  -IVF  -Expectant management  -MFM consult    Discussed with Dr. Jarrett

## 2024-10-16 NOTE — CONSULT NOTE ADULT - SUBJECTIVE AND OBJECTIVE BOX
SPENCER THAKKAR  23y  at 20w2d GA by LMP with di/di twin gestation, presents to L&D for vaginal bleeding and painful contractions that started half an hour prior to arrival to L&D. Patient recently admitted to L&D for vaginal bleeding and was found to have anhydramnios of fetus A with suspected PPROM.  Denies fevers, chills, nausea, vomiting, chest pain, SOB, dizziness and headache. No other complaints at this time.     LMP: 2024  CHAS; 3/3/2025    Prenatal course is significant for:  Di-di twin gestation complicated by suspected PPROM with anhydramnios of fetus A.    POB: G1  PGYN: -fibroids, -ovarian cysts, denies STD hx, denies abnormal PAPs   PMH: gastritis, anxiety/depression  PSH: Denies  SH: Denies EtOH, tobacco and illicit drug use during this pregnancy; feels safe at home   Meds: PNVs  Allergies: NKDA      REVIEW OF SYSTEMS:    CONSTITUTIONAL: No weakness, fevers or chills  EYES/ENT: No visual changes;  No vertigo or throat pain   NECK: No pain or stiffness  RESPIRATORY: No cough, wheezing, hemoptysis; No shortness of breath  CARDIOVASCULAR: No chest pain or palpitations  GASTROINTESTINAL: No abdominal or epigastric pain. No nausea, vomiting, or hematemesis; No diarrhea or constipation. No melena or hematochezia.  GENITOURINARY: No dysuria, frequency or hematuria  NEUROLOGICAL: No numbness or weakness  SKIN: No itching, burning, rashes, or lesions   All other review of systems is negative unless indicated above.    Vital Signs:  Vital Signs Last 24 Hrs  T(C): 36.7 (16 Oct 2024 10:38), Max: 36.7 (16 Oct 2024 06:21)  T(F): 98.06 (16 Oct 2024 10:38), Max: 98.1 (16 Oct 2024 06:24)  HR: 70 (16 Oct 2024 10:36) (70 - 137)  BP: 114/67 (16 Oct 2024 10:36) (114/67 - 141/89)  RR: 18 (16 Oct 2024 10:38) (14 - 18)    Parameters below as of 16 Oct 2024 06:24  Patient On (Oxygen Delivery Method): room air      Height (cm): 167.6 (10-16-24 @ 06:40)  Weight (kg): 80.3 (10-16-24 @ 06:52)  BMI (kg/m2): 28.6 (10-16-24 @ 06:52)  BSA (m2): 1.9 (10-16-24 @ 06:52)    Physical Exam:  General: Adult female in NAD  Abdomen: soft, non-tender, gravid uterus  Pelvic: /-2  Ext: No cyanosis, edema or calf tenderness  Skin: No rashes or lesions on exposed skin  Neuro: Grossly intact    Labs:                          11.8   20.79 )-----------( 279      ( 16 Oct 2024 06:31 )             33.8         10-15-24 @ 07:01  -  10-16-24 @ 07:00  --------------------------------------------------------  IN: 350 mL / OUT: 0 mL / NET: 350 mL    10-16-24 @ 07:01  -  10-16-24 @ 11:04  --------------------------------------------------------  IN: 1000 mL / OUT: 0 mL / NET: 1000 mL      MEDICATIONS  (STANDING):  chlorhexidine 2% Cloths 1 Application(s) Topical daily  citric acid/sodium citrate Solution 15 milliLiter(s) Oral every 6 hours  lactated ringers. 1000 milliLiter(s) (125 mL/Hr) IV Continuous <Continuous>  oxytocin Infusion 167 milliUNIT(s)/Min (167 mL/Hr) IV Continuous <Continuous>

## 2024-10-16 NOTE — CHART NOTE - NSCHARTNOTEFT_GEN_A_CORE
Called to bedside for further evaluation due to increasing pain.  Patient emotional at bedside and experiencing intense pain q2mins.  SVE 4/80/-1 with fetal head palpable at cervical os.   Patient requesting epidural for pain control, anesthesia made aware.

## 2024-10-16 NOTE — CONSULT NOTE ADULT - PROBLEM SELECTOR RECOMMENDATION 4
-Pregnancy complicated by suspected PPROM and anhydramnios of fetus A.  -Patient would like to proceed with expectant management at this time.  -WBC 15 > 20.  -Tachycardia to 130s.  -Afebrile.  -Will continue to monitor for signs of chorioamnionitis.

## 2024-10-16 NOTE — CONSULT NOTE ADULT - ASSESSMENT
23y  at 20w2d GA by LMP with di/di twin gestation, presents to L&D for vaginal bleeding and painful contractions in the setting of suspected PPROM of Baby A.

## 2024-10-16 NOTE — OB PROVIDER H&P - ATTENDING COMMENTS
23y  at 20w2d GA by LMP with di/di twin gestation, presents to L&D for vaginal bleeding and painful contractions that started half an hour prior to arrival to L&D. Patient recently admitted to L&D for vaginal bleeding and was found to have anhydramnios of fetus A with suspected PPROM.  Denies fevers, chills, nausea, vomiting, chest pain, SOB, dizziness and headache. No other complaints at this time.     Prenatal course is significant for:  Di-di twin gestation complicated by suspected PPROM with anhydramnios of fetus A.    Gen: NAD, well-appearing, AAOx3   Abd: Soft, gravid  Ext: non-tender, non-edematous    SVE:  /-2  Bedside sono:   Baby A: vertex presentation, anhydramnios, FH 130s  Baby B: vertex presentation, MVP 3cm, FH 140s    A/P:   -Admit to L&D for PTL.  -Consent  -Admission labs  -IVF  -Expectant management  -MFM consult

## 2024-10-16 NOTE — OB PROVIDER H&P - NSLOWPPHRISK_OBGYN_A_OB
No previous uterine incision/[place] Pregnancy/Less than or equal to 4 previous vaginal births/No known bleeding disorder/No history of postpartum hemorrhage/No other PPH risks indicated

## 2024-10-16 NOTE — CONSULT NOTE ADULT - ATTENDING COMMENTS
M - DCDA twin IUP at 20w2d, well dated and receives routine prenatal care, presents with contractions and vaginal bleeding in pregnancy complicated by known anhydramnios and PPROM for Twin A. Patient recently admitted to [facility] and extensively counseled regarding findings, prognosis, and management options. After a period of inpatient management, she opted for expectant management at home. She presented today after experiencing new onset contractions and vaginal bleeding. Exam notable for cervical change to 4 cm as was previously 1-2 cm on discharge. No active bleeding noted. Patient is afebrile with elevated BP, no severe range values, and intermittent tachycardia.  Laboratory evaluation significant for leukocytosis. H/H stable. FHR visualized x 2. Patient advised of the above findings and overall poor prognosis. We reviewed gestational age at which there can be fetal/ intervention and survival outside of the uterus. Given her current gestational age, she is not a candidate for betamethasone or MGSO4 for fetal neuroprotection. We reviewed option for IOL with the understanding that both fetuses would be delivered without  interventions. Patient declines and opts for expectant management.  The potential for delayed interval delivery had been discussed with her at the time of prior M consultation. At this time, she opts for expectant management. She understands that delayed interval delivery may not be possible. She also understands that expectant management is not recommended if there is evidence of infection, heavy vaginal bleeding, or any change in maternal status. Will send HELLP labs given elevated BP, trend CBC and coagulation profile.  Check FH every shift and at patient request. Care discussed with nursing and covering OB service attending. All questions answered to the best of my ability.   Beatriz Ceja MD  Maternal Fetal Medicine

## 2024-10-16 NOTE — CHART NOTE - NSCHARTNOTEFT_GEN_A_CORE
FHR performed at bedside  FHR A: 158bpm  FHR B: 168bpm    No abdominal tenderness on exam.  Toxo: CTX q6-8min  S/p IV Ofirmev with minimal improvement in pain. Declining morphine/epidural.

## 2024-10-16 NOTE — OB PROVIDER H&P - HISTORY OF PRESENT ILLNESS
23y  at 20w2d GA by LMP with di/di twin gestation, presents to L&D for vaginal bleeding and painful contractions that started half an hour prior to arrival to L&D. Patient recently admitted to L&D for vaginal bleeding and was found to have anhydramnios of fetus A with suspected PPROM.  Denies fevers, chills, nausea, vomiting, chest pain, SOB, dizziness and headache. No other complaints at this time.     Prenatal course is significant for:  Di-di twin gestation complicated by suspected PPROM with anhydramnios of fetus A.    POB: G1  PGYN: -fibroids, -ovarian cysts, denies STD hx, denies abnormal PAPs   PMH: gastritis, anxiety/depression  PSH: Denies  SH: Denies EtOH, tobacco and illicit drug use during this pregnancy; feels safe at home   Meds: PNVs  Allergies: NKDA

## 2024-10-16 NOTE — CHART NOTE - NSCHARTNOTEFT_GEN_A_CORE
MFM - Went to see patient to answer any additional questions. Patient reports feeling in pressure in rectum. VE performed and unchanged 4/50/-1. Hard stool palpated in rectum. Will write for glycerin suppository and daily miralax.  Beatriz Ceja MD  Maternal Fetal Medicine

## 2024-10-16 NOTE — CONSULT NOTE ADULT - PROBLEM SELECTOR RECOMMENDATION 3
-Patient presenting with vaginal bleeding.  -Hgb: 11.8.  -Coags pending.  -Continue to monitor bleeding. None

## 2024-10-16 NOTE — OB PROVIDER H&P - NSHPPHYSICALEXAM_GEN_ALL_CORE
T(C): 36.7 (10-16-24 @ 06:24), Max: 36.7 (10-16-24 @ 06:21)  HR: 137 (10-16-24 @ 06:24) (137 - 137)  BP: 141/89 (10-16-24 @ 06:24) (141/89 - 141/89)  RR: 18 (10-16-24 @ 06:24) (14 - 18)    Gen: NAD, well-appearing, AAOx3   Abd: Soft, gravid  Ext: non-tender, non-edematous    SVE:  4/80/-2  Bedside sono:   Baby A: vertex presentation, anhydramnios, FH 130s  Baby B: vertex presentation, MVP 3cm, FH 140s

## 2024-10-16 NOTE — CONSULT NOTE ADULT - PROBLEM SELECTOR RECOMMENDATION 2
Patient presenting with vagina bleeding and painful contractions in the setting pf suspected PPROM of baby A.  -Cervical change noted. SVE 1.5/50/-3 (10/11) to 4/80/-2 (10/16).  -Patient with irregular contractions.  -Discussed various management options with patient. At this time patient would like to proceed with expectant management and delayed delivery of baby B if possible.  -Discussed risks of expectant management and delayed delivery with patient including risks of infection and hemorrhage.  -Patient understands risks and would like to proceed.

## 2024-10-16 NOTE — CHART NOTE - NSCHARTNOTEFT_GEN_A_CORE
Called to bedside for painful contractions. Patient tearful.  Endorsing contractions every 5-7 minutes starting in lower pelvic and radiating to low back.   SVE unchanged from prior exam at 4/80/-1.  Discussed pain management options including IV ofirmev, morphine and epidural.  Patient electing for IV ofirmev at this time. Called to bedside for painful contractions. Patient tearful.  Endorsing contractions every 5-7 minutes starting in lower pelvic and radiating to low back.   SVE unchanged from prior exam at 4/50/-1.  Discussed pain management options including IV ofirmev, morphine and epidural.  Patient electing for IV ofirmev at this time.

## 2024-10-17 ENCOUNTER — APPOINTMENT (OUTPATIENT)
Dept: OBGYN | Facility: CLINIC | Age: 24
End: 2024-10-17

## 2024-10-17 LAB
APTT BLD: 26.3 SEC — SIGNIFICANT CHANGE UP (ref 24.5–35.6)
BASOPHILS # BLD AUTO: 0.04 K/UL — SIGNIFICANT CHANGE UP (ref 0–0.2)
BASOPHILS NFR BLD AUTO: 0.3 % — SIGNIFICANT CHANGE UP (ref 0–2)
EOSINOPHIL # BLD AUTO: 0.02 K/UL — SIGNIFICANT CHANGE UP (ref 0–0.5)
EOSINOPHIL NFR BLD AUTO: 0.1 % — SIGNIFICANT CHANGE UP (ref 0–6)
FIBRINOGEN PPP-MCNC: 542 MG/DL — HIGH (ref 200–450)
HCT VFR BLD CALC: 25.5 % — LOW (ref 34.5–45)
HGB BLD-MCNC: 9 G/DL — LOW (ref 11.5–15.5)
IMM GRANULOCYTES NFR BLD AUTO: 1.1 % — HIGH (ref 0–0.9)
INR BLD: 1.04 RATIO — SIGNIFICANT CHANGE UP (ref 0.85–1.16)
LYMPHOCYTES # BLD AUTO: 1.61 K/UL — SIGNIFICANT CHANGE UP (ref 1–3.3)
LYMPHOCYTES # BLD AUTO: 10.5 % — LOW (ref 13–44)
MCHC RBC-ENTMCNC: 31 PG — SIGNIFICANT CHANGE UP (ref 27–34)
MCHC RBC-ENTMCNC: 35.3 GM/DL — SIGNIFICANT CHANGE UP (ref 32–36)
MCV RBC AUTO: 87.9 FL — SIGNIFICANT CHANGE UP (ref 80–100)
MONOCYTES # BLD AUTO: 0.77 K/UL — SIGNIFICANT CHANGE UP (ref 0–0.9)
MONOCYTES NFR BLD AUTO: 5 % — SIGNIFICANT CHANGE UP (ref 2–14)
NEUTROPHILS # BLD AUTO: 12.77 K/UL — HIGH (ref 1.8–7.4)
NEUTROPHILS NFR BLD AUTO: 83 % — HIGH (ref 43–77)
PLATELET # BLD AUTO: 197 K/UL — SIGNIFICANT CHANGE UP (ref 150–400)
PROTHROM AB SERPL-ACNC: 12.1 SEC — SIGNIFICANT CHANGE UP (ref 9.9–13.4)
RBC # BLD: 2.9 M/UL — LOW (ref 3.8–5.2)
RBC # FLD: 14.5 % — SIGNIFICANT CHANGE UP (ref 10.3–14.5)
WBC # BLD: 15.38 K/UL — HIGH (ref 3.8–10.5)
WBC # FLD AUTO: 15.38 K/UL — HIGH (ref 3.8–10.5)

## 2024-10-17 PROCEDURE — 59409 OBSTETRICAL CARE: CPT | Mod: U9

## 2024-10-17 PROCEDURE — 88305 TISSUE EXAM BY PATHOLOGIST: CPT | Mod: 26

## 2024-10-17 PROCEDURE — 88291 CYTO/MOLECULAR REPORT: CPT

## 2024-10-17 RX ORDER — MAGNESIUM HYDROXIDE 400 MG/5ML
30 SUSPENSION, ORAL (FINAL DOSE FORM) ORAL
Refills: 0 | Status: DISCONTINUED | OUTPATIENT
Start: 2024-10-17 | End: 2024-10-18

## 2024-10-17 RX ORDER — HYDROMORPHONE HYDROCHLORIDE 1 MG/ML
2 INJECTION, SOLUTION INTRAMUSCULAR; INTRAVENOUS; SUBCUTANEOUS ONCE
Refills: 0 | Status: DISCONTINUED | OUTPATIENT
Start: 2024-10-17 | End: 2024-10-17

## 2024-10-17 RX ORDER — PRENATAL VIT,CAL 76/IRON/FOLIC 29 MG-1 MG
1 TABLET ORAL DAILY
Refills: 0 | Status: DISCONTINUED | OUTPATIENT
Start: 2024-10-17 | End: 2024-10-18

## 2024-10-17 RX ORDER — DIBUCAINE 1 %
1 OINTMENT (GRAM) TOPICAL EVERY 6 HOURS
Refills: 0 | Status: DISCONTINUED | OUTPATIENT
Start: 2024-10-17 | End: 2024-10-18

## 2024-10-17 RX ORDER — GENTAMICIN 10 MG/ML
340 INJECTION, SOLUTION INTRAMUSCULAR; INTRAVENOUS ONCE
Refills: 0 | Status: COMPLETED | OUTPATIENT
Start: 2024-10-17 | End: 2024-10-17

## 2024-10-17 RX ORDER — TETANUS TOXOID, REDUCED DIPHTHERIA TOXOID AND ACELLULAR PERTUSSIS VACCINE, ADSORBED 5; 2.5; 8; 8; 2.5 [IU]/.5ML; [IU]/.5ML; UG/.5ML; UG/.5ML; UG/.5ML
0.5 SUSPENSION INTRAMUSCULAR ONCE
Refills: 0 | Status: DISCONTINUED | OUTPATIENT
Start: 2024-10-17 | End: 2024-10-18

## 2024-10-17 RX ORDER — PRAMOXINE HYDROCHLORIDE 10 MG/ML
1 LOTION TOPICAL EVERY 4 HOURS
Refills: 0 | Status: DISCONTINUED | OUTPATIENT
Start: 2024-10-17 | End: 2024-10-18

## 2024-10-17 RX ORDER — OXYCODONE HYDROCHLORIDE 30 MG/1
5 TABLET, FILM COATED, EXTENDED RELEASE ORAL ONCE
Refills: 0 | Status: DISCONTINUED | OUTPATIENT
Start: 2024-10-17 | End: 2024-10-18

## 2024-10-17 RX ORDER — CABERGOLINE 0.5 MG/1
1 TABLET ORAL ONCE
Refills: 0 | Status: DISCONTINUED | OUTPATIENT
Start: 2024-10-17 | End: 2024-10-18

## 2024-10-17 RX ORDER — OXYTOCIN/RINGER'S LACTATE 20/500ML
167 PLASTIC BAG, INJECTION (ML) INTRAVENOUS
Qty: 30 | Refills: 0 | Status: DISCONTINUED | OUTPATIENT
Start: 2024-10-17 | End: 2024-10-18

## 2024-10-17 RX ORDER — AMPICILLIN TRIHYDRATE 125 MG/5ML
2000 SUSPENSION, RECONSTITUTED, ORAL (ML) ORAL ONCE
Refills: 0 | Status: COMPLETED | OUTPATIENT
Start: 2024-10-17 | End: 2024-10-17

## 2024-10-17 RX ORDER — OXYCODONE HYDROCHLORIDE 30 MG/1
5 TABLET, FILM COATED, EXTENDED RELEASE ORAL
Refills: 0 | Status: DISCONTINUED | OUTPATIENT
Start: 2024-10-17 | End: 2024-10-18

## 2024-10-17 RX ORDER — ANTI-ITCH CREAM 1 G/100G
1 OINTMENT TOPICAL EVERY 6 HOURS
Refills: 0 | Status: DISCONTINUED | OUTPATIENT
Start: 2024-10-17 | End: 2024-10-18

## 2024-10-17 RX ORDER — SODIUM CHLORIDE 0.9 % (FLUSH) 0.9 %
3 SYRINGE (ML) INJECTION EVERY 8 HOURS
Refills: 0 | Status: DISCONTINUED | OUTPATIENT
Start: 2024-10-17 | End: 2024-10-18

## 2024-10-17 RX ORDER — SOAP/LANOLIN
1 BAR TOPICAL EVERY 4 HOURS
Refills: 0 | Status: DISCONTINUED | OUTPATIENT
Start: 2024-10-17 | End: 2024-10-18

## 2024-10-17 RX ORDER — KETOROLAC TROMETHAMINE 10 MG/1
30 TABLET, FILM COATED ORAL ONCE
Refills: 0 | Status: DISCONTINUED | OUTPATIENT
Start: 2024-10-17 | End: 2024-10-18

## 2024-10-17 RX ORDER — DIPHENHYDRAMINE HCL 12.5MG/5ML
25 LIQUID (ML) ORAL EVERY 6 HOURS
Refills: 0 | Status: DISCONTINUED | OUTPATIENT
Start: 2024-10-17 | End: 2024-10-18

## 2024-10-17 RX ORDER — ACETAMINOPHEN 325 MG
975 TABLET ORAL
Refills: 0 | Status: DISCONTINUED | OUTPATIENT
Start: 2024-10-17 | End: 2024-10-18

## 2024-10-17 RX ORDER — ONDANSETRON HCL/PF 4 MG/2 ML
4 VIAL (ML) INJECTION ONCE
Refills: 0 | Status: DISCONTINUED | OUTPATIENT
Start: 2024-10-17 | End: 2024-10-18

## 2024-10-17 RX ADMIN — GENTAMICIN 100 MILLIGRAM(S): 10 INJECTION, SOLUTION INTRAMUSCULAR; INTRAVENOUS at 13:20

## 2024-10-17 RX ADMIN — Medication 3 MILLILITER(S): at 22:00

## 2024-10-17 RX ADMIN — Medication 200 MILLIGRAM(S): at 12:30

## 2024-10-17 RX ADMIN — HYDROMORPHONE HYDROCHLORIDE 2 MILLIGRAM(S): 1 INJECTION, SOLUTION INTRAMUSCULAR; INTRAVENOUS; SUBCUTANEOUS at 11:59

## 2024-10-17 RX ADMIN — Medication 167 MILLIUNIT(S)/MIN: at 11:37

## 2024-10-17 RX ADMIN — Medication 125 MILLILITER(S): at 00:37

## 2024-10-17 NOTE — OB PROVIDER LABOR PROGRESS NOTE - ASSESSMENT
SVE unchanged.  Patient with irregular contractions.  will continue with expectant management.
reeval PRN  continue with current maangement

## 2024-10-17 NOTE — OB PROVIDER DELIVERY SUMMARY - NSSELHIDDEN_OBGYN_ALL_OB_FT
[NS_DeliveryAttending1_OBGYN_ALL_OB_FT:[id number]],[NS_DeliveryAssist1_OBGYN_ALL_OB_FT:[id number]=]

## 2024-10-17 NOTE — OB PROVIDER DELIVERY SUMMARY - NSPROVIDERDELIVERYNOTE_OBGYN_ALL_OB_FT
Called to bedside by RN. Patient reports feeling rectal pressure and feeling the urge to push. SVE 5/100/0. Fetus A noted to be in the vagina. Infant A delivered with maternal expulsive efforts. Cord clamped and cut. Shortly after, patient reports feeling urge to push again. Infant B delivered breech en caul with placenta with maternal efforts. Pitocin started. Placenta A delivered spontaneously with maternal efforts. Bimanual exam performed and membranes palpable in the AMERICO expressed with bimanual exam. Bedside sono showed small amount of retained products in AMERICO. Bimanual exam repeated with expulsion of remainder of membranes. Repeat bedside sono shows thin endometrial echo. Fundus firm, minimal bleeding. Perineum and vagina inspected with no lacerations. EBL 150cc. Hemostasis noted. Pt tolerated procedure well, in stable condition, recovering in LDR. Instrument/sponge count correct x 2 and confirmed by nurse.    Fetus A: Stillborn female, anhydramnios  Fetus B: stillborn male, clear amniotic fluid

## 2024-10-17 NOTE — PROGRESS NOTE ADULT - ATTENDING COMMENTS
MFM - Patient seen with OB resident and covering service attending. Overnight events reviewed. DCDA twin gestation with PPROM / anhydramnios Twin A who presents in  labor. Overnight patient had increasing pain and received epidural.  This am reported increasing pressure and exam unchanged from last evaluation. Patient understands delivery of Twin A is inevitable and given gestational age, there is no available intervention for fetal or  benefit. She was counseled that we could augment labor to shorten time to delivery; however, this would not be selective for Twin A and the expectation would be delivery of both fetuses.  Patient would like continued expectant management and evaluation after delivery of Twin A for delayed interval delivery of Twin B. At this time, maternal status is hemodynamically stable and there is no evidence of infection. She understands that in the event of any change in maternal status, expectant management is not recommended. Patient and her mother voiced understanding of the above counseling and recommendations and all questions were answered to the best of my ability.   Beatriz Ceja MD  Maternal Fetal Medicine

## 2024-10-17 NOTE — OB RN DELIVERY SUMMARY - NSSELHIDDEN_OBGYN_ALL_OB_FT
[NS_DeliveryAttending1_OBGYN_ALL_OB_FT:[id number]],[NS_DeliveryAssist1_OBGYN_ALL_OB_FT:[id number]],[NS_DeliveryRN_OBGYN_ALL_OB_FT:[id number]=]

## 2024-10-17 NOTE — PROGRESS NOTE ADULT - PROBLEM SELECTOR PLAN 4
-Pregnancy complicated by suspected PPROM and anhydramnios of fetus A.  -Patient would like to proceed with expectant management at this time.  -WBC 15 > 20. Pending Am labs.  -Tachycardia to 130s on presentation, now resolved. HR overnight 60s-70s.  -Afebrile.  -Will continue to monitor for signs of chorioamnionitis.

## 2024-10-17 NOTE — CHART NOTE - NSCHARTNOTEFT_GEN_A_CORE
Called to bedside to evaluate vaginal bleeding  Pad patient wearing for 20 mins had 20cc dark blood with mucous  Patient feeling some pressure, epidural working well for pain  SVE 4.5/90/-1, minimally changed from prior exam  Fetal head well applied on cervix

## 2024-10-18 ENCOUNTER — TRANSCRIPTION ENCOUNTER (OUTPATIENT)
Age: 24
End: 2024-10-18

## 2024-10-18 VITALS
DIASTOLIC BLOOD PRESSURE: 73 MMHG | TEMPERATURE: 98 F | SYSTOLIC BLOOD PRESSURE: 116 MMHG | RESPIRATION RATE: 16 BRPM | HEART RATE: 86 BPM | OXYGEN SATURATION: 97 %

## 2024-10-18 LAB
HCT VFR BLD CALC: 26.7 % — LOW (ref 34.5–45)
HGB BLD-MCNC: 9.1 G/DL — LOW (ref 11.5–15.5)
MCHC RBC-ENTMCNC: 30.3 PG — SIGNIFICANT CHANGE UP (ref 27–34)
MCHC RBC-ENTMCNC: 34.1 GM/DL — SIGNIFICANT CHANGE UP (ref 32–36)
MCV RBC AUTO: 89 FL — SIGNIFICANT CHANGE UP (ref 80–100)
PLATELET # BLD AUTO: 214 K/UL — SIGNIFICANT CHANGE UP (ref 150–400)
RBC # BLD: 3 M/UL — LOW (ref 3.8–5.2)
RBC # FLD: 14.6 % — HIGH (ref 10.3–14.5)
WBC # BLD: 11.64 K/UL — HIGH (ref 3.8–10.5)
WBC # FLD AUTO: 11.64 K/UL — HIGH (ref 3.8–10.5)

## 2024-10-18 PROCEDURE — 86780 TREPONEMA PALLIDUM: CPT

## 2024-10-18 PROCEDURE — 85610 PROTHROMBIN TIME: CPT

## 2024-10-18 PROCEDURE — 86901 BLOOD TYPING SEROLOGIC RH(D): CPT

## 2024-10-18 PROCEDURE — 88280 CHROMOSOME KARYOTYPE STUDY: CPT

## 2024-10-18 PROCEDURE — 85025 COMPLETE CBC W/AUTO DIFF WBC: CPT

## 2024-10-18 PROCEDURE — 88233 TISSUE CULTURE SKIN/BIOPSY: CPT

## 2024-10-18 PROCEDURE — 80053 COMPREHEN METABOLIC PANEL: CPT

## 2024-10-18 PROCEDURE — 88264 CHROMOSOME ANALYSIS 20-25: CPT

## 2024-10-18 PROCEDURE — 85027 COMPLETE CBC AUTOMATED: CPT

## 2024-10-18 PROCEDURE — 86850 RBC ANTIBODY SCREEN: CPT

## 2024-10-18 PROCEDURE — 76815 OB US LIMITED FETUS(S): CPT

## 2024-10-18 PROCEDURE — 85384 FIBRINOGEN ACTIVITY: CPT

## 2024-10-18 PROCEDURE — 88305 TISSUE EXAM BY PATHOLOGIST: CPT

## 2024-10-18 PROCEDURE — 85730 THROMBOPLASTIN TIME PARTIAL: CPT

## 2024-10-18 PROCEDURE — 36415 COLL VENOUS BLD VENIPUNCTURE: CPT

## 2024-10-18 PROCEDURE — 86900 BLOOD TYPING SEROLOGIC ABO: CPT

## 2024-10-18 RX ORDER — ACETAMINOPHEN 325 MG
3 TABLET ORAL
Qty: 0 | Refills: 0 | DISCHARGE
Start: 2024-10-18

## 2024-10-18 NOTE — PROGRESS NOTE ADULT - ASSESSMENT
23y  at 20w3d GA by LMP with di/di twin gestation, presents to L&D for vaginal bleeding and painful contractions, admitted for  labor.
IUP with di/di twin gestation in labor at 20w2d  afebrile  stable condition    iv fluids  ob labs  monitor   expectant management  Residents to contact M for further recommendations
LS sx
SPENCER THAKKAR is a 23y  now PPD#1 s/p spontaneous vaginal delivery at 20w3d of di/di twin gestation secondary to PPROM.

## 2024-10-18 NOTE — PROGRESS NOTE ADULT - PROBLEM SELECTOR PLAN 5
-Mild range BPs upon presentation today of 140s/80s. Ps now normotensive.  -PIH labs wnl.
-Mild range BPs upon presentation today of 140s/80s. BPs now normotensive.  -PIH labs wnl.

## 2024-10-18 NOTE — DISCHARGE NOTE OB - ADMISSION DATE +STARTOFVISITDATE
Statement Selected
mallampati 1, denies loose teeth, upper partial removable bridge/normal mouth and gums/moist

## 2024-10-18 NOTE — DISCHARGE NOTE OB - PATIENT PORTAL LINK FT
You can access the FollowMyHealth Patient Portal offered by [facility] by registering at the following website: [email or web address]. By joining [employer]’s FollowMyHealth portal, you will also be able to view your health information using other applications (apps) compatible with our system.

## 2024-10-18 NOTE — DISCHARGE NOTE OB - HOSPITAL COURSE
normal... SPENCER THAKKAR is a 23y  now s/p spontaneous vaginal delivery at 20w3d of di/di twin gestation secondary to PPROM.Hospital course complicated by vaginal bleeding and  labor leading to delivery of twin still born infants. Her pain was well controlled. She is tolerating a regular diet. She is ambulating independently. She was able to void after removal of locke. Labs and Vitals WNL upon discharge.

## 2024-10-18 NOTE — DISCHARGE NOTE OB - FINANCIAL ASSISTANCE
[facility] provides services at a reduced cost to those who are determined to be eligible through [facility]’s financial assistance program. Information regarding [facility]’s financial assistance program can be found by going to [email or web address] or by calling [phone].

## 2024-10-18 NOTE — DISCHARGE NOTE OB - CARE PLAN
Principal Discharge DX:	Spontaneous vaginal delivery  Assessment and plan of treatment:	Please call your provider to schedule your post partum visit in one week. Take medications as directed, regular diet, activity as tolerated. Nothing per vagina for 6 weeks (incl. sex, douching, etc). If you have additional concerns, please inform your provider.   1

## 2024-10-18 NOTE — DISCHARGE NOTE OB - CARE PROVIDER_API CALL
[facility],   Phone: [phone]  Fax: (   )    -  Established Patient  Follow Up Time: 1 week

## 2024-10-18 NOTE — DISCHARGE NOTE NURSING/CASE MANAGEMENT/SOCIAL WORK - FINANCIAL ASSISTANCE
[facility] provides services at a reduced cost to those who are determined to be eligible through [facility]’s financial assistance program. Information regarding [facility]’s financial assistance program can be found by going to [email or web address] or by calling [phone].

## 2024-10-18 NOTE — PROGRESS NOTE ADULT - ATTENDING COMMENTS
MFM Attending    23 year old  now PPD#1 s/p spontaneous vaginal delivery at 20w3d of di/di twin gestation secondary to PPROM. Patient reports minimal cramping, blood loss decreasing, no pain. Briefly discussed risks for future pregnancy. Consider MFM preconception consultation or consultation early in next pregnancy to discuss cervical length surveillance. Consider d/c home today if patient continues to feel well and is meeting all discharge goals.    ERIK Key

## 2024-10-18 NOTE — DISCHARGE NOTE NURSING/CASE MANAGEMENT/SOCIAL WORK - PATIENT PORTAL LINK FT
You can access the FollowMyHealth Patient Portal offered by [facility] by registering at the following website: [email or web address]. By joining [employer]’s FollowMyHealth portal, you will also be able to view your health information using other applications (apps) compatible with our system.

## 2024-10-18 NOTE — DISCHARGE NOTE NURSING/CASE MANAGEMENT/SOCIAL WORK - NSDCVIVACCINE_GEN_ALL_CORE_FT
Influenza, split virus, trivalent, preservative free; 14-Oct-2024 12:24; Ruby Mae (RN); [employer]; [id number] (Exp. Date: 01-Jun-2025); IntraMuscular; Deltoid Right.; 0.5 milliLiter(s); VIS (VIS Published: 06-Aug-2021, VIS Presented: 14-Oct-2024);

## 2024-10-18 NOTE — PROGRESS NOTE ADULT - PROBLEM SELECTOR PLAN 4
-Pregnancy complicated by suspected PPROM and anhydramnios of fetus A.  -WBC 15 > 20 > 15 > 11.  -Tachycardia to 130s on presentation, now resolved. HR overnight 60s-70s.  -Afebrile.

## 2024-10-18 NOTE — DISCHARGE NOTE NURSING/CASE MANAGEMENT/SOCIAL WORK - NSDCPEFALRISK_GEN_ALL_CORE
For information on Fall & Injury Prevention, visit: [email or web address] OR  [email or web address] OR  https://www.cdc.gov/steadi/patient.html

## 2024-10-18 NOTE — PROGRESS NOTE ADULT - PROBLEM SELECTOR PROBLEM 4
premature rupture of membranes (PPROM) with unknown onset of labor
 premature rupture of membranes (PPROM) with unknown onset of labor

## 2024-10-18 NOTE — DISCHARGE NOTE OB - PLAN OF CARE
Please call your provider to schedule your post partum visit in one week. Take medications as directed, regular diet, activity as tolerated. Nothing per vagina for 6 weeks (incl. sex, douching, etc). If you have additional concerns, please inform your provider.

## 2024-10-18 NOTE — DISCHARGE NOTE OB - PROVIDER TOKENS
FREE:[LAST:[[facility]],PHONE:[phone]],FAX:[(   )    -],FOLLOWUP:[1 week],ESTABLISHEDPATIENT:[T]]

## 2024-10-18 NOTE — PROGRESS NOTE ADULT - SUBJECTIVE AND OBJECTIVE BOX
23y Female s/p labor epidural, dural puncture on 10/17/2024    Vital Signs     T(C): 36.9 (18 Oct 2024 08:37), Max: 37.2 (17 Oct 2024 12:10)  T(F): 98.5 (18 Oct 2024 08:37), Max: 99 (17 Oct 2024 12:10)  HR: 77 (18 Oct 2024 08:37) (65 - 165)  BP: 117/76 (18 Oct 2024 08:37) (94/55 - 154/80)  BP(mean): 79 (18 Oct 2024 05:37) (79 - 79)  RR: 16 (18 Oct 2024 08:37) (16 - 18)  SpO2: 97% (18 Oct 2024 08:37) (74% - 100%)    Parameters below as of 18 Oct 2024 08:37    Patient On (Oxygen Delivery Method): room air            Patient's overall anesthesia satisfaction: Positive    Patient seen and doing well     No headache      No residual numbness or weakness, sensory and motor function intact    No anesthetic complications or complaints noted or reported                 
24 yo G1 at 20w2d with di/di gestation  Patient presents with severe cramping and cervical dilation.  Patient was previously seen on 10/14/24 and US noted Twin A anhydramnios   Suspected PPROM  Patient was counseled on poor prognosis.  She agreed to expectant management and weekly observation.  Twin A cephalic presentation by US   Cervix 1.5/50/-3 on 10/11/24    Patient now presents with severe cramping and found to be 4 cm dilated/80/-3    Patient was counseled on clamping cord and potentially allowing Twin B pregnancy to continue.  Patient requests we do everything possible to continue pregnancy.  She was informed this was not a guarantee to succeed.  Low potential for success.  She voiced understanding.        Bedside US demonstrates cardiac activity in Twin A and Twin B
SPENCER THAKKAR  [facility] DELV [id number]  23y  at 20w3d GA by LMP with di/di twin gestation, presents to L&D for vaginal bleeding and painful contractions, admitted for  labor.    Patient resting comfortably this morning. Has epidural in place. Denies fevers chills, lightheadedness, dizziness, abdominal pain. No other complaints at this time.      Vital Signs:  Vital Signs Last 24 Hrs  T(C): 36.8 (17 Oct 2024 02:58), Max: 37.1 (16 Oct 2024 23:58)  T(F): 98.24 (17 Oct 2024 02:58), Max: 98.78 (16 Oct 2024 23:58)  HR: 71 (17 Oct 2024 06:25) (56 - 117)  BP: 104/53 (17 Oct 2024 06:25) (92/55 - 134/77)  RR: 18 (16 Oct 2024 18:16) (16 - 18)  SpO2: 100% (17 Oct 2024 06:25) (97% - 100%)      Height (cm): 167.6 (10-16-24 @ 06:40)  Weight (kg): 80.3 (10-16-24 @ 06:52)  BMI (kg/m2): 28.6 (10-16-24 @ 06:52)  BSA (m2): 1.9 (10-16-24 @ 06:52)    Physical Exam:  General: Adult female in NAD  Abdomen: soft, non-tender, gravid uterus  Pelvic: Deferred  Ext: No cyanosis, edema or calf tenderness  Skin: No rashes or lesions on exposed skin  Neuro: Grossly intact    Labs:                          11.8   20.79 )-----------( 279      ( 16 Oct 2024 06:31 )             33.8     10-    137  |  105  |  3.5[L]  ----------------------------<  73  4.1   |  21.0[L]  |  0.35[L]    Ca    8.9      16 Oct 2024 10:28    TPro  6.3[L]  /  Alb  3.2[L]  /  TBili  <0.2[L]  /  DBili  x   /  AST  14  /  ALT  8   /  AlkPhos  97  10-16    PT/INR - ( 16 Oct 2024 10:28 )   PT: 11.6 sec;   INR: 1.03 ratio         PTT - ( 16 Oct 2024 10:28 )  PTT:25.2 sec        MEDICATIONS  (STANDING):  chlorhexidine 2% Cloths 1 Application(s) Topical daily  citric acid/sodium citrate Solution 15 milliLiter(s) Oral every 6 hours  lactated ringers. 1000 milliLiter(s) (125 mL/Hr) IV Continuous <Continuous>  oxytocin Infusion 167 milliUNIT(s)/Min (167 mL/Hr) IV Continuous <Continuous>    MEDICATIONS  (PRN):  polyethylene glycol 3350 17 Gram(s) Oral daily PRN Constipation  
SPENCER THAKKAR is a 23y  now PPD#1 s/p spontaneous vaginal delivery at 20w3d of di/di twin gestation secondary to PPROM.    The patient has no complaints. Pain controlled with current treatment regimen. She endorses appropriate lochia, which is decreasing. She denies fevers, chills, nausea and vomiting. She denies lightheadedness, dizziness, palpitations, chest pain, SOB, RUQ pain, blurry vision, new-onset swelling, and blurry vision.    Vital Signs:  Vital Signs Last 24 Hrs  T(C): 36.8 (18 Oct 2024 05:37), Max: 37.2 (17 Oct 2024 12:10)  T(F): 98.3 (18 Oct 2024 05:37), Max: 99 (17 Oct 2024 12:10)  HR: 66 (18 Oct 2024 05:37) (62 - 165)  BP: 102/67 (18 Oct 2024 05:37) (93/50 - 154/80)  BP(mean): 79 (18 Oct 2024 05:37) (79 - 79)  RR: 17 (18 Oct 2024 05:37) (16 - 20)  SpO2: 100% (18 Oct 2024 05:37) (74% - 100%)    Parameters below as of 18 Oct 2024 05:37  Patient On (Oxygen Delivery Method): room air    Physical Exam:  General: Adult female in NAD  Abdomen: soft, non-tender, non-distended  Ext: No cyanosis, edema or calf tenderness  Skin: No rashes or lesions on exposed skin  Neuro: Grossly intact    Labs:                          9.1    11.64 )-----------( 214      ( 18 Oct 2024 05:33 )             26.7     10-16    137  |  105  |  3.5[L]  ----------------------------<  73  4.1   |  21.0[L]  |  0.35[L]    Ca    8.9      16 Oct 2024 10:28    TPro  6.3[L]  /  Alb  3.2[L]  /  TBili  <0.2[L]  /  DBili  x   /  AST  14  /  ALT  8   /  AlkPhos  97  10-16    PT/INR - ( 17 Oct 2024 07:36 )   PT: 12.1 sec;   INR: 1.04 ratio         PTT - ( 17 Oct 2024 07:36 )  PTT:26.3 sec        Radiology:    MEDICATIONS  (STANDING):  acetaminophen     Tablet .. 975 milliGRAM(s) Oral <User Schedule>  cabergoline 1 milliGRAM(s) Oral once  diphtheria/tetanus/pertussis (acellular) Vaccine (Adacel) 0.5 milliLiter(s) IntraMuscular once  ibuprofen  Tablet. 600 milliGRAM(s) Oral every 6 hours  ketorolac   Injectable 30 milliGRAM(s) IV Push once  ondansetron Injectable 4 milliGRAM(s) IV Push once  oxytocin Infusion 167 milliUNIT(s)/Min (167 mL/Hr) IV Continuous <Continuous>  prenatal multivitamin 1 Tablet(s) Oral daily  sodium chloride 0.9% lock flush 3 milliLiter(s) IV Push every 8 hours    MEDICATIONS  (PRN):  benzocaine 20%/menthol 0.5% Spray 1 Spray(s) Topical every 6 hours PRN for Perineal discomfort  dibucaine 1% Ointment 1 Application(s) Topical every 6 hours PRN Perineal discomfort  diphenhydrAMINE 25 milliGRAM(s) Oral every 6 hours PRN Pruritus  hydrocortisone 1% Cream 1 Application(s) Topical every 6 hours PRN Moderate Pain (4-6)  lanolin Ointment 1 Application(s) Topical every 6 hours PRN nipple soreness  magnesium hydroxide Suspension 30 milliLiter(s) Oral two times a day PRN Constipation  oxyCODONE    IR 5 milliGRAM(s) Oral once PRN Moderate to Severe Pain (4-10)  oxyCODONE    IR 5 milliGRAM(s) Oral every 3 hours PRN Moderate to Severe Pain (4-10)  polyethylene glycol 3350 17 Gram(s) Oral daily PRN Constipation  pramoxine 1%/zinc 5% Cream 1 Application(s) Topical every 4 hours PRN Moderate Pain (4-6)  simethicone 80 milliGRAM(s) Chew every 4 hours PRN Gas  witch hazel Pads 1 Application(s) Topical every 4 hours PRN Perineal discomfort

## 2024-10-18 NOTE — PROGRESS NOTE ADULT - PROBLEM SELECTOR PLAN 1
-20w3d GA by LMP today.  -Di/di twin gestation complicated by PPROM and anhydramnios of baby A.  -Continuous toco monitoring.  -FH every shift.  -Given previable gestational age patient not a candidate for betamethasone for fetal lung maturity or magnesium infusion for fetal neuroprotection.
-Vital signs stable  -Hgb stable. 9.0 > 9.1  -Continue routine postpartum care and education  -Stillborn female infant and stillborn male infant.  -Patient offered cabergoline for lactation suppression. Medication ordered.  -Pending social work consult  -Likely discharge to home today

## 2024-10-18 NOTE — DISCHARGE NOTE OB - MEDICATION SUMMARY - MEDICATIONS TO TAKE
I will START or STAY ON the medications listed below when I get home from the hospital:    ibuprofen 600 mg oral tablet  -- 1 tab(s) by mouth every 6 hours  -- Indication: For Pain    acetaminophen 325 mg oral tablet  -- 3 tab(s) by mouth every 6 hours  -- Indication: For Pain    Prenatal Multivitamins with Folic Acid 1 mg oral tablet  -- 1 tab(s) by mouth once a day  -- Indication: For nutrition

## 2024-10-18 NOTE — PROGRESS NOTE ADULT - PROBLEM SELECTOR PLAN 2
Patient presenting with vagina bleeding and painful contractions in the setting of suspected PPROM of baby A.  -Cervical change noted. SVE 1.5/50/-3 (10/11) to 4/80/-2 (10/16).  -Discussed various management options with patient. At this time patient would like to proceed with expectant management and delayed delivery of baby B if possible.  -Discussed risks of expectant management and delayed delivery with patient including risks of infection and hemorrhage.  -Patient understands risks and would like to proceed.  -This morning patient with contractions every 2-3 minutes on toco.  -Patient has epidural in place.  -Will continue to manage expectantly.
Patient presenting with vagina bleeding and painful contractions in the setting of suspected PPROM of baby A. Now s/p vaginal deliver of both fetus.

## 2024-10-22 ENCOUNTER — APPOINTMENT (OUTPATIENT)
Dept: ANTEPARTUM | Facility: CLINIC | Age: 24
End: 2024-10-22

## 2024-10-24 ENCOUNTER — NON-APPOINTMENT (OUTPATIENT)
Age: 24
End: 2024-10-24

## 2024-10-24 ENCOUNTER — APPOINTMENT (OUTPATIENT)
Dept: OBGYN | Facility: CLINIC | Age: 24
End: 2024-10-24
Payer: COMMERCIAL

## 2024-10-24 VITALS
BODY MASS INDEX: 28.28 KG/M2 | WEIGHT: 176 LBS | HEIGHT: 66 IN | HEART RATE: 109 BPM | DIASTOLIC BLOOD PRESSURE: 85 MMHG | SYSTOLIC BLOOD PRESSURE: 124 MMHG

## 2024-10-24 DIAGNOSIS — Z87.59 PERSONAL HISTORY OF OTHER COMPLICATIONS OF PREGNANCY, CHILDBIRTH AND THE PUERPERIUM: ICD-10-CM

## 2024-10-24 DIAGNOSIS — Z86.69 PERSONAL HISTORY OF OTHER DISEASES OF THE NERVOUS SYSTEM AND SENSE ORGANS: ICD-10-CM

## 2024-10-24 DIAGNOSIS — B97.7 PAPILLOMAVIRUS AS THE CAUSE OF DISEASES CLASSIFIED ELSEWHERE: ICD-10-CM

## 2024-10-24 PROCEDURE — 99203 OFFICE O/P NEW LOW 30 MIN: CPT

## 2024-11-05 LAB — CHROM ANALY OVERALL INTERP SPEC-IMP: SIGNIFICANT CHANGE UP

## 2024-11-06 LAB — CHROM ANALY OVERALL INTERP SPEC-IMP: SIGNIFICANT CHANGE UP

## 2024-12-13 ENCOUNTER — NON-APPOINTMENT (OUTPATIENT)
Age: 24
End: 2024-12-13

## 2025-01-07 ENCOUNTER — NON-APPOINTMENT (OUTPATIENT)
Age: 25
End: 2025-01-07

## 2025-02-06 ENCOUNTER — APPOINTMENT (OUTPATIENT)
Dept: OBGYN | Facility: CLINIC | Age: 25
End: 2025-02-06
Payer: COMMERCIAL

## 2025-02-06 VITALS
BODY MASS INDEX: 31.34 KG/M2 | WEIGHT: 195 LBS | HEIGHT: 66 IN | HEART RATE: 90 BPM | SYSTOLIC BLOOD PRESSURE: 151 MMHG | DIASTOLIC BLOOD PRESSURE: 89 MMHG

## 2025-02-06 DIAGNOSIS — Z91.89 OTHER SPECIFIED PERSONAL RISK FACTORS, NOT ELSEWHERE CLASSIFIED: ICD-10-CM

## 2025-02-06 DIAGNOSIS — Z01.419 ENCOUNTER FOR GYNECOLOGICAL EXAMINATION (GENERAL) (ROUTINE) W/OUT ABNORMAL FINDINGS: ICD-10-CM

## 2025-02-06 DIAGNOSIS — Z12.39 ENCOUNTER FOR OTHER SCREENING FOR MALIGNANT NEOPLASM OF BREAST: ICD-10-CM

## 2025-02-06 DIAGNOSIS — Z11.3 ENCOUNTER FOR SCREENING FOR INFECTIONS WITH A PREDOMINANTLY SEXUAL MODE OF TRANSMISSION: ICD-10-CM

## 2025-02-06 PROCEDURE — 99459 PELVIC EXAMINATION: CPT

## 2025-02-06 PROCEDURE — 99395 PREV VISIT EST AGE 18-39: CPT

## 2025-02-06 RX ORDER — FOLIC ACID 1 MG/1
1 TABLET ORAL DAILY
Qty: 90 | Refills: 3 | Status: ACTIVE | COMMUNITY
Start: 2025-02-06 | End: 1900-01-01

## 2025-02-07 PROBLEM — Z91.89 AT RISK FOR DEPRESSION: Status: ACTIVE | Noted: 2025-02-07

## 2025-02-11 DIAGNOSIS — B97.7 PAPILLOMAVIRUS AS THE CAUSE OF DISEASES CLASSIFIED ELSEWHERE: ICD-10-CM

## 2025-02-12 LAB
C TRACH RRNA SPEC QL NAA+PROBE: NOT DETECTED
N GONORRHOEA RRNA SPEC QL NAA+PROBE: NOT DETECTED
SOURCE TP AMPLIFICATION: NORMAL
T VAGINALIS RRNA SPEC QL NAA+PROBE: NOT DETECTED

## 2025-02-14 LAB — HPV HIGH+LOW RISK DNA PNL CVX: NOT DETECTED

## 2025-02-20 ENCOUNTER — TRANSCRIPTION ENCOUNTER (OUTPATIENT)
Age: 25
End: 2025-02-20

## 2025-03-25 ENCOUNTER — APPOINTMENT (OUTPATIENT)
Dept: RADIOLOGY | Facility: CLINIC | Age: 25
End: 2025-03-25
Payer: MEDICAID

## 2025-03-25 PROCEDURE — 72100 X-RAY EXAM L-S SPINE 2/3 VWS: CPT

## 2025-04-04 ENCOUNTER — APPOINTMENT (OUTPATIENT)
Dept: OBGYN | Facility: CLINIC | Age: 25
End: 2025-04-04
Payer: MEDICAID

## 2025-04-04 VITALS
BODY MASS INDEX: 32.47 KG/M2 | DIASTOLIC BLOOD PRESSURE: 85 MMHG | HEART RATE: 74 BPM | SYSTOLIC BLOOD PRESSURE: 119 MMHG | HEIGHT: 66 IN | WEIGHT: 202 LBS

## 2025-04-04 DIAGNOSIS — Z11.3 ENCOUNTER FOR SCREENING FOR INFECTIONS WITH A PREDOMINANTLY SEXUAL MODE OF TRANSMISSION: ICD-10-CM

## 2025-04-04 DIAGNOSIS — Z12.39 ENCOUNTER FOR OTHER SCREENING FOR MALIGNANT NEOPLASM OF BREAST: ICD-10-CM

## 2025-04-04 DIAGNOSIS — Z01.419 ENCOUNTER FOR GYNECOLOGICAL EXAMINATION (GENERAL) (ROUTINE) W/OUT ABNORMAL FINDINGS: ICD-10-CM

## 2025-04-04 DIAGNOSIS — N93.9 ABNORMAL UTERINE AND VAGINAL BLEEDING, UNSPECIFIED: ICD-10-CM

## 2025-04-04 PROCEDURE — 99459 PELVIC EXAMINATION: CPT

## 2025-04-04 PROCEDURE — 99212 OFFICE O/P EST SF 10 MIN: CPT
